# Patient Record
Sex: FEMALE | Race: WHITE | Employment: OTHER
[De-identification: names, ages, dates, MRNs, and addresses within clinical notes are randomized per-mention and may not be internally consistent; named-entity substitution may affect disease eponyms.]

---

## 2017-01-01 ENCOUNTER — HOME CARE VISIT (OUTPATIENT)
Dept: SCHEDULING | Facility: HOME HEALTH | Age: 82
End: 2017-01-01
Payer: MEDICARE

## 2017-01-01 ENCOUNTER — HOME CARE VISIT (OUTPATIENT)
Dept: HOSPICE | Facility: HOSPICE | Age: 82
End: 2017-01-01
Payer: MEDICARE

## 2017-01-01 ENCOUNTER — HOSPITAL ENCOUNTER (INPATIENT)
Age: 82
LOS: 5 days | Discharge: HOSPICE | DRG: 871 | End: 2017-01-25
Attending: EMERGENCY MEDICINE | Admitting: INTERNAL MEDICINE
Payer: MEDICARE

## 2017-01-01 ENCOUNTER — HOSPICE ADMISSION (OUTPATIENT)
Dept: HOSPICE | Facility: HOSPICE | Age: 82
End: 2017-01-01
Payer: MEDICARE

## 2017-01-01 VITALS — RESPIRATION RATE: 16 BRPM | HEART RATE: 92 BPM | TEMPERATURE: 97.3 F

## 2017-01-01 VITALS
TEMPERATURE: 97.2 F | TEMPERATURE: 97.2 F | RESPIRATION RATE: 24 BRPM | HEART RATE: 100 BPM | HEART RATE: 92 BPM | DIASTOLIC BLOOD PRESSURE: 46 MMHG | RESPIRATION RATE: 24 BRPM | DIASTOLIC BLOOD PRESSURE: 48 MMHG | SYSTOLIC BLOOD PRESSURE: 112 MMHG | SYSTOLIC BLOOD PRESSURE: 116 MMHG

## 2017-01-01 VITALS
DIASTOLIC BLOOD PRESSURE: 50 MMHG | OXYGEN SATURATION: 95 % | HEART RATE: 77 BPM | SYSTOLIC BLOOD PRESSURE: 103 MMHG | TEMPERATURE: 96.9 F | WEIGHT: 140 LBS | HEIGHT: 65 IN | RESPIRATION RATE: 16 BRPM | BODY MASS INDEX: 23.32 KG/M2

## 2017-01-01 VITALS — HEART RATE: 92 BPM | SYSTOLIC BLOOD PRESSURE: 108 MMHG | DIASTOLIC BLOOD PRESSURE: 70 MMHG | RESPIRATION RATE: 24 BRPM

## 2017-01-01 VITALS
DIASTOLIC BLOOD PRESSURE: 42 MMHG | HEART RATE: 104 BPM | TEMPERATURE: 97.2 F | RESPIRATION RATE: 32 BRPM | SYSTOLIC BLOOD PRESSURE: 90 MMHG

## 2017-01-01 VITALS
RESPIRATION RATE: 20 BRPM | SYSTOLIC BLOOD PRESSURE: 130 MMHG | TEMPERATURE: 96.1 F | DIASTOLIC BLOOD PRESSURE: 70 MMHG | HEART RATE: 80 BPM

## 2017-01-01 DIAGNOSIS — N39.0 URINARY TRACT INFECTION WITHOUT HEMATURIA, SITE UNSPECIFIED: ICD-10-CM

## 2017-01-01 DIAGNOSIS — L89.90 DECUBITUS ULCER, UNSPECIFIED PRESSURE ULCER STAGE: ICD-10-CM

## 2017-01-01 DIAGNOSIS — A41.9 SEPSIS, DUE TO UNSPECIFIED ORGANISM: Primary | ICD-10-CM

## 2017-01-01 LAB
ALBUMIN SERPL BCP-MCNC: 1.7 G/DL (ref 3.2–4.6)
ALBUMIN/GLOB SERPL: 0.5 {RATIO} (ref 1.2–3.5)
ALP SERPL-CCNC: 239 U/L (ref 50–136)
ALT SERPL-CCNC: 16 U/L (ref 12–65)
ANION GAP BLD CALC-SCNC: 11 MMOL/L (ref 7–16)
ANION GAP BLD CALC-SCNC: 6 MMOL/L (ref 7–16)
ANION GAP BLD CALC-SCNC: 6 MMOL/L (ref 7–16)
ANION GAP BLD CALC-SCNC: 7 MMOL/L (ref 7–16)
ANION GAP BLD CALC-SCNC: 8 MMOL/L (ref 7–16)
ANION GAP BLD CALC-SCNC: 9 MMOL/L (ref 7–16)
AST SERPL W P-5'-P-CCNC: 24 U/L (ref 15–37)
BACTERIA SPEC CULT: NORMAL
BACTERIA URNS QL MICRO: ABNORMAL /HPF
BASOPHILS # BLD AUTO: 0 K/UL (ref 0–0.2)
BASOPHILS # BLD: 0 % (ref 0–2)
BILIRUB SERPL-MCNC: 0.8 MG/DL (ref 0.2–1.1)
BUN SERPL-MCNC: 37 MG/DL (ref 8–23)
BUN SERPL-MCNC: 48 MG/DL (ref 8–23)
BUN SERPL-MCNC: 54 MG/DL (ref 8–23)
BUN SERPL-MCNC: 57 MG/DL (ref 8–23)
BUN SERPL-MCNC: 58 MG/DL (ref 8–23)
BUN SERPL-MCNC: 64 MG/DL (ref 8–23)
CALCIUM SERPL-MCNC: 7.5 MG/DL (ref 8.3–10.4)
CALCIUM SERPL-MCNC: 7.5 MG/DL (ref 8.3–10.4)
CALCIUM SERPL-MCNC: 7.7 MG/DL (ref 8.3–10.4)
CALCIUM SERPL-MCNC: 7.8 MG/DL (ref 8.3–10.4)
CALCIUM SERPL-MCNC: 7.8 MG/DL (ref 8.3–10.4)
CALCIUM SERPL-MCNC: 7.9 MG/DL (ref 8.3–10.4)
CASTS URNS QL MICRO: ABNORMAL /LPF
CHLORIDE SERPL-SCNC: 103 MMOL/L (ref 98–107)
CHLORIDE SERPL-SCNC: 107 MMOL/L (ref 98–107)
CHLORIDE SERPL-SCNC: 108 MMOL/L (ref 98–107)
CHLORIDE SERPL-SCNC: 109 MMOL/L (ref 98–107)
CHLORIDE SERPL-SCNC: 111 MMOL/L (ref 98–107)
CHLORIDE SERPL-SCNC: 112 MMOL/L (ref 98–107)
CO2 SERPL-SCNC: 22 MMOL/L (ref 21–32)
CO2 SERPL-SCNC: 23 MMOL/L (ref 21–32)
CO2 SERPL-SCNC: 24 MMOL/L (ref 21–32)
CO2 SERPL-SCNC: 24 MMOL/L (ref 21–32)
CO2 SERPL-SCNC: 25 MMOL/L (ref 21–32)
CO2 SERPL-SCNC: 25 MMOL/L (ref 21–32)
CREAT SERPL-MCNC: 0.84 MG/DL (ref 0.6–1)
CREAT SERPL-MCNC: 1.06 MG/DL (ref 0.6–1)
CREAT SERPL-MCNC: 1.12 MG/DL (ref 0.6–1)
CREAT SERPL-MCNC: 1.16 MG/DL (ref 0.6–1)
CREAT SERPL-MCNC: 1.27 MG/DL (ref 0.6–1)
CREAT SERPL-MCNC: 1.4 MG/DL (ref 0.6–1)
DIFFERENTIAL METHOD BLD: ABNORMAL
EOSINOPHIL # BLD: 0 K/UL (ref 0–0.8)
EOSINOPHIL # BLD: 0.1 K/UL (ref 0–0.8)
EOSINOPHIL # BLD: 0.2 K/UL (ref 0–0.8)
EOSINOPHIL NFR BLD: 0 % (ref 0.5–7.8)
EOSINOPHIL NFR BLD: 1 % (ref 0.5–7.8)
EPI CELLS #/AREA URNS HPF: ABNORMAL /HPF
ERYTHROCYTE [DISTWIDTH] IN BLOOD BY AUTOMATED COUNT: 14.9 % (ref 11.9–14.6)
ERYTHROCYTE [DISTWIDTH] IN BLOOD BY AUTOMATED COUNT: 14.9 % (ref 11.9–14.6)
ERYTHROCYTE [DISTWIDTH] IN BLOOD BY AUTOMATED COUNT: 15.1 % (ref 11.9–14.6)
ERYTHROCYTE [DISTWIDTH] IN BLOOD BY AUTOMATED COUNT: 15.2 % (ref 11.9–14.6)
ERYTHROCYTE [DISTWIDTH] IN BLOOD BY AUTOMATED COUNT: 15.3 % (ref 11.9–14.6)
ERYTHROCYTE [DISTWIDTH] IN BLOOD BY AUTOMATED COUNT: 15.3 % (ref 11.9–14.6)
GLOBULIN SER CALC-MCNC: 3.6 G/DL (ref 2.3–3.5)
GLUCOSE SERPL-MCNC: 106 MG/DL (ref 65–100)
GLUCOSE SERPL-MCNC: 108 MG/DL (ref 65–100)
GLUCOSE SERPL-MCNC: 127 MG/DL (ref 65–100)
GLUCOSE SERPL-MCNC: 129 MG/DL (ref 65–100)
GLUCOSE SERPL-MCNC: 72 MG/DL (ref 65–100)
GLUCOSE SERPL-MCNC: 86 MG/DL (ref 65–100)
GRAM STN SPEC: NORMAL
HCT VFR BLD AUTO: 20.4 % (ref 35.8–46.3)
HCT VFR BLD AUTO: 22.6 % (ref 35.8–46.3)
HCT VFR BLD AUTO: 25.8 % (ref 35.8–46.3)
HCT VFR BLD AUTO: 26.6 % (ref 35.8–46.3)
HCT VFR BLD AUTO: 26.6 % (ref 35.8–46.3)
HCT VFR BLD AUTO: 26.7 % (ref 35.8–46.3)
HCT VFR BLD AUTO: 26.8 % (ref 35.8–46.3)
HGB BLD-MCNC: 6.3 G/DL (ref 11.7–15.4)
HGB BLD-MCNC: 7.2 G/DL (ref 11.7–15.4)
HGB BLD-MCNC: 7.9 G/DL (ref 11.7–15.4)
HGB BLD-MCNC: 8.3 G/DL (ref 11.7–15.4)
HGB BLD-MCNC: 8.4 G/DL (ref 11.7–15.4)
HGB BLD-MCNC: 8.4 G/DL (ref 11.7–15.4)
HGB BLD-MCNC: 8.5 G/DL (ref 11.7–15.4)
IMM GRANULOCYTES # BLD: 0.1 K/UL (ref 0–0.5)
IMM GRANULOCYTES NFR BLD AUTO: 0.3 % (ref 0–5)
IMM GRANULOCYTES NFR BLD AUTO: 0.4 % (ref 0–5)
IMM GRANULOCYTES NFR BLD AUTO: 0.4 % (ref 0–5)
IMM GRANULOCYTES NFR BLD AUTO: 0.5 % (ref 0–5)
LACTATE BLD-SCNC: 1.3 MMOL/L (ref 0.5–1.9)
LYMPHOCYTES # BLD AUTO: 10 % (ref 13–44)
LYMPHOCYTES # BLD AUTO: 7 % (ref 13–44)
LYMPHOCYTES # BLD AUTO: 8 % (ref 13–44)
LYMPHOCYTES # BLD AUTO: 8 % (ref 13–44)
LYMPHOCYTES # BLD AUTO: 9 % (ref 13–44)
LYMPHOCYTES # BLD: 1 K/UL (ref 0.5–4.6)
LYMPHOCYTES # BLD: 1.2 K/UL (ref 0.5–4.6)
LYMPHOCYTES # BLD: 1.2 K/UL (ref 0.5–4.6)
LYMPHOCYTES # BLD: 1.3 K/UL (ref 0.5–4.6)
LYMPHOCYTES # BLD: 1.7 K/UL (ref 0.5–4.6)
LYMPHOCYTES # BLD: 2 K/UL (ref 0.5–4.6)
LYMPHOCYTES NFR BLD MANUAL: 7 % (ref 16–44)
MCH RBC QN AUTO: 27.7 PG (ref 26.1–32.9)
MCH RBC QN AUTO: 28.2 PG (ref 26.1–32.9)
MCH RBC QN AUTO: 28.2 PG (ref 26.1–32.9)
MCH RBC QN AUTO: 28.4 PG (ref 26.1–32.9)
MCH RBC QN AUTO: 28.6 PG (ref 26.1–32.9)
MCH RBC QN AUTO: 28.7 PG (ref 26.1–32.9)
MCHC RBC AUTO-ENTMCNC: 30.6 G/DL (ref 31.4–35)
MCHC RBC AUTO-ENTMCNC: 31.2 G/DL (ref 31.4–35)
MCHC RBC AUTO-ENTMCNC: 31.5 G/DL (ref 31.4–35)
MCHC RBC AUTO-ENTMCNC: 31.6 G/DL (ref 31.4–35)
MCHC RBC AUTO-ENTMCNC: 31.7 G/DL (ref 31.4–35)
MCHC RBC AUTO-ENTMCNC: 31.9 G/DL (ref 31.4–35)
MCV RBC AUTO: 88.6 FL (ref 79.6–97.8)
MCV RBC AUTO: 89.6 FL (ref 79.6–97.8)
MCV RBC AUTO: 90.5 FL (ref 79.6–97.8)
MCV RBC AUTO: 91.1 FL (ref 79.6–97.8)
MM INDURATION POC: NORMAL MM (ref 0–5)
MONOCYTES # BLD: 0.4 K/UL (ref 0.1–1.3)
MONOCYTES # BLD: 0.4 K/UL (ref 0.1–1.3)
MONOCYTES # BLD: 0.5 K/UL (ref 0.1–1.3)
MONOCYTES # BLD: 0.6 K/UL (ref 0.1–1.3)
MONOCYTES # BLD: 0.7 K/UL (ref 0.1–1.3)
MONOCYTES NFR BLD AUTO: 3 % (ref 4–12)
MONOCYTES NFR BLD AUTO: 4 % (ref 4–12)
NEUTS BAND NFR BLD MANUAL: 4 % (ref 0–6)
NEUTS SEG # BLD: 11.2 K/UL (ref 1.7–8.2)
NEUTS SEG # BLD: 11.2 K/UL (ref 1.7–8.2)
NEUTS SEG # BLD: 14.4 K/UL (ref 1.7–8.2)
NEUTS SEG # BLD: 16.9 K/UL (ref 1.7–8.2)
NEUTS SEG # BLD: 17.4 K/UL (ref 1.7–8.2)
NEUTS SEG # BLD: 22.1 K/UL (ref 1.7–8.2)
NEUTS SEG NFR BLD AUTO: 86 % (ref 43–78)
NEUTS SEG NFR BLD AUTO: 88 % (ref 43–78)
NEUTS SEG NFR BLD AUTO: 88 % (ref 43–78)
NEUTS SEG NFR BLD AUTO: 89 % (ref 43–78)
NEUTS SEG NFR BLD AUTO: 90 % (ref 43–78)
NEUTS SEG NFR BLD MANUAL: 89 % (ref 47–75)
PLATELET # BLD AUTO: 178 K/UL (ref 150–450)
PLATELET # BLD AUTO: 187 K/UL (ref 150–450)
PLATELET # BLD AUTO: 237 K/UL (ref 150–450)
PLATELET # BLD AUTO: 238 K/UL (ref 150–450)
PLATELET # BLD AUTO: 256 K/UL (ref 150–450)
PLATELET # BLD AUTO: 256 K/UL (ref 150–450)
PLATELET COMMENTS,PCOM: ADEQUATE
PLATELET COMMENTS,PCOM: ADEQUATE
PMV BLD AUTO: 11.9 FL (ref 10.8–14.1)
PMV BLD AUTO: 11.9 FL (ref 10.8–14.1)
PMV BLD AUTO: 12 FL (ref 10.8–14.1)
PMV BLD AUTO: 12 FL (ref 10.8–14.1)
PMV BLD AUTO: 12.1 FL (ref 10.8–14.1)
PMV BLD AUTO: 12.2 FL (ref 10.8–14.1)
POTASSIUM SERPL-SCNC: 3.6 MMOL/L (ref 3.5–5.1)
POTASSIUM SERPL-SCNC: 3.9 MMOL/L (ref 3.5–5.1)
POTASSIUM SERPL-SCNC: 4.2 MMOL/L (ref 3.5–5.1)
POTASSIUM SERPL-SCNC: 4.3 MMOL/L (ref 3.5–5.1)
POTASSIUM SERPL-SCNC: 5 MMOL/L (ref 3.5–5.1)
POTASSIUM SERPL-SCNC: 5.4 MMOL/L (ref 3.5–5.1)
PPD POC: NORMAL NEGATIVE
PROT SERPL-MCNC: 5.3 G/DL (ref 6.3–8.2)
RBC # BLD AUTO: 2.55 M/UL (ref 4.05–5.25)
RBC # BLD AUTO: 2.85 M/UL (ref 4.05–5.25)
RBC # BLD AUTO: 2.92 M/UL (ref 4.05–5.25)
RBC # BLD AUTO: 2.94 M/UL (ref 4.05–5.25)
RBC # BLD AUTO: 2.96 M/UL (ref 4.05–5.25)
RBC # BLD AUTO: 2.98 M/UL (ref 4.05–5.25)
RBC #/AREA URNS HPF: ABNORMAL /HPF
RBC MORPH BLD: ABNORMAL
RBC MORPH BLD: ABNORMAL
SERVICE CMNT-IMP: NORMAL
SODIUM SERPL-SCNC: 136 MMOL/L (ref 136–145)
SODIUM SERPL-SCNC: 139 MMOL/L (ref 136–145)
SODIUM SERPL-SCNC: 140 MMOL/L (ref 136–145)
SODIUM SERPL-SCNC: 141 MMOL/L (ref 136–145)
SODIUM SERPL-SCNC: 142 MMOL/L (ref 136–145)
SODIUM SERPL-SCNC: 142 MMOL/L (ref 136–145)
TROPONIN I BLD-MCNC: 0 NG/ML (ref 0–0.08)
TROPONIN I BLD-MCNC: 0.07 NG/ML (ref 0–0.08)
VANCOMYCIN TROUGH SERPL-MCNC: 15.6 UG/ML (ref 5–20)
WBC # BLD AUTO: 12.8 K/UL (ref 4.3–11.1)
WBC # BLD AUTO: 12.9 K/UL (ref 4.3–11.1)
WBC # BLD AUTO: 16.1 K/UL (ref 4.3–11.1)
WBC # BLD AUTO: 19.5 K/UL (ref 4.3–11.1)
WBC # BLD AUTO: 19.7 K/UL (ref 4.3–11.1)
WBC # BLD AUTO: 23.8 K/UL (ref 4.3–11.1)
WBC MORPH BLD: ABNORMAL
WBC URNS QL MICRO: >100 /HPF

## 2017-01-01 PROCEDURE — 74011250636 HC RX REV CODE- 250/636: Performed by: INTERNAL MEDICINE

## 2017-01-01 PROCEDURE — 65270000029 HC RM PRIVATE

## 2017-01-01 PROCEDURE — G0299 HHS/HOSPICE OF RN EA 15 MIN: HCPCS

## 2017-01-01 PROCEDURE — 36415 COLL VENOUS BLD VENIPUNCTURE: CPT | Performed by: INTERNAL MEDICINE

## 2017-01-01 PROCEDURE — 0651 HSPC ROUTINE HOME CARE

## 2017-01-01 PROCEDURE — HOSPICE MEDICATION HC HH HOSPICE MEDICATION

## 2017-01-01 PROCEDURE — G0156 HHCP-SVS OF AIDE,EA 15 MIN: HCPCS

## 2017-01-01 PROCEDURE — 97161 PT EVAL LOW COMPLEX 20 MIN: CPT

## 2017-01-01 PROCEDURE — A4927 NON-STERILE GLOVES: HCPCS

## 2017-01-01 PROCEDURE — 83605 ASSAY OF LACTIC ACID: CPT

## 2017-01-01 PROCEDURE — 80048 BASIC METABOLIC PNL TOTAL CA: CPT | Performed by: INTERNAL MEDICINE

## 2017-01-01 PROCEDURE — 99343 PR HOME VST NEW PATIENT MOD-HI SEVERITY 45 MINUTES: CPT | Performed by: INTERNAL MEDICINE

## 2017-01-01 PROCEDURE — G0155 HHCP-SVS OF CSW,EA 15 MIN: HCPCS

## 2017-01-01 PROCEDURE — 85025 COMPLETE CBC W/AUTO DIFF WBC: CPT | Performed by: EMERGENCY MEDICINE

## 2017-01-01 PROCEDURE — 87077 CULTURE AEROBIC IDENTIFY: CPT | Performed by: EMERGENCY MEDICINE

## 2017-01-01 PROCEDURE — 0T9B70Z DRAINAGE OF BLADDER WITH DRAINAGE DEVICE, VIA NATURAL OR ARTIFICIAL OPENING: ICD-10-PCS | Performed by: INTERNAL MEDICINE

## 2017-01-01 PROCEDURE — A6250 SKIN SEAL PROTECT MOISTURIZR: HCPCS

## 2017-01-01 PROCEDURE — 84484 ASSAY OF TROPONIN QUANT: CPT

## 2017-01-01 PROCEDURE — 80202 ASSAY OF VANCOMYCIN: CPT | Performed by: INTERNAL MEDICINE

## 2017-01-01 PROCEDURE — 3336500001 HSPC ELECTION

## 2017-01-01 PROCEDURE — 86580 TB INTRADERMAL TEST: CPT | Performed by: INTERNAL MEDICINE

## 2017-01-01 PROCEDURE — 74011250637 HC RX REV CODE- 250/637: Performed by: INTERNAL MEDICINE

## 2017-01-01 PROCEDURE — 94760 N-INVAS EAR/PLS OXIMETRY 1: CPT

## 2017-01-01 PROCEDURE — 36416 COLLJ CAPILLARY BLOOD SPEC: CPT | Performed by: INTERNAL MEDICINE

## 2017-01-01 PROCEDURE — 87088 URINE BACTERIA CULTURE: CPT | Performed by: EMERGENCY MEDICINE

## 2017-01-01 PROCEDURE — 74011000250 HC RX REV CODE- 250: Performed by: INTERNAL MEDICINE

## 2017-01-01 PROCEDURE — 74011000258 HC RX REV CODE- 258: Performed by: INTERNAL MEDICINE

## 2017-01-01 PROCEDURE — A6216 NON-STERILE GAUZE<=16 SQ IN: HCPCS

## 2017-01-01 PROCEDURE — 77030019605

## 2017-01-01 PROCEDURE — T4522 ADULT SIZE BRIEF/DIAPER MED: HCPCS

## 2017-01-01 PROCEDURE — 85025 COMPLETE CBC W/AUTO DIFF WBC: CPT | Performed by: INTERNAL MEDICINE

## 2017-01-01 PROCEDURE — 77010033678 HC OXYGEN DAILY

## 2017-01-01 PROCEDURE — 80053 COMPREHEN METABOLIC PANEL: CPT | Performed by: EMERGENCY MEDICINE

## 2017-01-01 PROCEDURE — 99285 EMERGENCY DEPT VISIT HI MDM: CPT | Performed by: EMERGENCY MEDICINE

## 2017-01-01 PROCEDURE — 76450000000

## 2017-01-01 PROCEDURE — 77030018836 HC SOL IRR NACL ICUM -A

## 2017-01-01 PROCEDURE — 87040 BLOOD CULTURE FOR BACTERIA: CPT | Performed by: EMERGENCY MEDICINE

## 2017-01-01 PROCEDURE — 87040 BLOOD CULTURE FOR BACTERIA: CPT | Performed by: INTERNAL MEDICINE

## 2017-01-01 PROCEDURE — 97165 OT EVAL LOW COMPLEX 30 MIN: CPT

## 2017-01-01 PROCEDURE — 85018 HEMOGLOBIN: CPT | Performed by: INTERNAL MEDICINE

## 2017-01-01 PROCEDURE — 87186 SC STD MICRODIL/AGAR DIL: CPT | Performed by: EMERGENCY MEDICINE

## 2017-01-01 PROCEDURE — 81015 MICROSCOPIC EXAM OF URINE: CPT | Performed by: EMERGENCY MEDICINE

## 2017-01-01 PROCEDURE — 74011000302 HC RX REV CODE- 302: Performed by: INTERNAL MEDICINE

## 2017-01-01 PROCEDURE — 87205 SMEAR GRAM STAIN: CPT | Performed by: EMERGENCY MEDICINE

## 2017-01-01 PROCEDURE — 87086 URINE CULTURE/COLONY COUNT: CPT | Performed by: EMERGENCY MEDICINE

## 2017-01-01 PROCEDURE — A6213 FOAM DRG >16<=48 SQ IN W/BDR: HCPCS

## 2017-01-01 PROCEDURE — 87641 MR-STAPH DNA AMP PROBE: CPT | Performed by: INTERNAL MEDICINE

## 2017-01-01 PROCEDURE — 74011250636 HC RX REV CODE- 250/636: Performed by: EMERGENCY MEDICINE

## 2017-01-01 PROCEDURE — 93306 TTE W/DOPPLER COMPLETE: CPT

## 2017-01-01 PROCEDURE — 77030034849

## 2017-01-01 PROCEDURE — T4541 LARGE DISPOSABLE UNDERPAD: HCPCS

## 2017-01-01 PROCEDURE — 77030032490 HC SLV COMPR SCD KNE COVD -B

## 2017-01-01 PROCEDURE — 87205 SMEAR GRAM STAIN: CPT | Performed by: INTERNAL MEDICINE

## 2017-01-01 PROCEDURE — 87641 MR-STAPH DNA AMP PROBE: CPT | Performed by: EMERGENCY MEDICINE

## 2017-01-01 RX ORDER — HALOPERIDOL 5 MG/ML
2 INJECTION INTRAMUSCULAR
Status: DISCONTINUED | OUTPATIENT
Start: 2017-01-01 | End: 2017-01-01 | Stop reason: HOSPADM

## 2017-01-01 RX ORDER — SODIUM CHLORIDE 9 MG/ML
100 INJECTION, SOLUTION INTRAVENOUS CONTINUOUS
Status: DISCONTINUED | OUTPATIENT
Start: 2017-01-01 | End: 2017-01-01

## 2017-01-01 RX ORDER — SODIUM CHLORIDE 0.9 % (FLUSH) 0.9 %
5-10 SYRINGE (ML) INJECTION AS NEEDED
Status: DISCONTINUED | OUTPATIENT
Start: 2017-01-01 | End: 2017-01-01 | Stop reason: HOSPADM

## 2017-01-01 RX ORDER — HEPARIN SODIUM 5000 [USP'U]/ML
5000 INJECTION, SOLUTION INTRAVENOUS; SUBCUTANEOUS EVERY 8 HOURS
Status: DISCONTINUED | OUTPATIENT
Start: 2017-01-01 | End: 2017-01-01

## 2017-01-01 RX ORDER — FUROSEMIDE 10 MG/ML
10 INJECTION INTRAMUSCULAR; INTRAVENOUS ONCE
Status: COMPLETED | OUTPATIENT
Start: 2017-01-01 | End: 2017-01-01

## 2017-01-01 RX ORDER — ACETAMINOPHEN 325 MG/1
650 TABLET ORAL
Status: DISCONTINUED | OUTPATIENT
Start: 2017-01-01 | End: 2017-01-01 | Stop reason: HOSPADM

## 2017-01-01 RX ORDER — SODIUM CHLORIDE 0.9 % (FLUSH) 0.9 %
5 SYRINGE (ML) INJECTION EVERY 8 HOURS
Status: DISCONTINUED | OUTPATIENT
Start: 2017-01-01 | End: 2017-01-01 | Stop reason: HOSPADM

## 2017-01-01 RX ORDER — HYDROCODONE BITARTRATE AND ACETAMINOPHEN 5; 325 MG/1; MG/1
1 TABLET ORAL
Status: DISCONTINUED | OUTPATIENT
Start: 2017-01-01 | End: 2017-01-01 | Stop reason: HOSPADM

## 2017-01-01 RX ORDER — HYDROCODONE BITARTRATE AND ACETAMINOPHEN 10; 325 MG/1; MG/1
1 TABLET ORAL
Status: ON HOLD | COMMUNITY
End: 2017-01-01

## 2017-01-01 RX ORDER — LEVOFLOXACIN 5 MG/ML
750 INJECTION, SOLUTION INTRAVENOUS
Status: DISCONTINUED | OUTPATIENT
Start: 2017-01-01 | End: 2017-01-01

## 2017-01-01 RX ORDER — HYDROCODONE BITARTRATE AND ACETAMINOPHEN 10; 325 MG/1; MG/1
1 TABLET ORAL
Qty: 10 TAB | Refills: 0 | Status: SHIPPED | OUTPATIENT
Start: 2017-01-01 | End: 2017-01-01

## 2017-01-01 RX ORDER — ONDANSETRON 2 MG/ML
4 INJECTION INTRAMUSCULAR; INTRAVENOUS
Status: DISCONTINUED | OUTPATIENT
Start: 2017-01-01 | End: 2017-01-01 | Stop reason: HOSPADM

## 2017-01-01 RX ORDER — SODIUM CHLORIDE 9 MG/ML
1000 INJECTION, SOLUTION INTRAVENOUS ONCE
Status: COMPLETED | OUTPATIENT
Start: 2017-01-01 | End: 2017-01-01

## 2017-01-01 RX ORDER — SODIUM CHLORIDE 9 MG/ML
75 INJECTION, SOLUTION INTRAVENOUS CONTINUOUS
Status: DISCONTINUED | OUTPATIENT
Start: 2017-01-01 | End: 2017-01-01

## 2017-01-01 RX ORDER — MORPHINE SULFATE 10 MG/1
10 CAPSULE, EXTENDED RELEASE ORAL DAILY
Status: ON HOLD | COMMUNITY
End: 2017-01-01

## 2017-01-01 RX ORDER — HYDROCODONE BITARTRATE AND ACETAMINOPHEN 10; 325 MG/1; MG/1
1 TABLET ORAL
Status: DISCONTINUED | OUTPATIENT
Start: 2017-01-01 | End: 2017-01-01 | Stop reason: HOSPADM

## 2017-01-01 RX ORDER — AMOXICILLIN 250 MG
2 CAPSULE ORAL
Status: DISCONTINUED | OUTPATIENT
Start: 2017-01-01 | End: 2017-01-01 | Stop reason: HOSPADM

## 2017-01-01 RX ORDER — MORPHINE SULFATE 10 MG/1
10 CAPSULE, EXTENDED RELEASE ORAL DAILY
Qty: 10 CAP | Refills: 0 | Status: SHIPPED | OUTPATIENT
Start: 2017-01-01 | End: 2017-01-01

## 2017-01-01 RX ORDER — MUPIROCIN 20 MG/G
OINTMENT TOPICAL 2 TIMES DAILY
Status: DISCONTINUED | OUTPATIENT
Start: 2017-01-01 | End: 2017-01-01 | Stop reason: HOSPADM

## 2017-01-01 RX ORDER — BISACODYL 5 MG
5 TABLET, DELAYED RELEASE (ENTERIC COATED) ORAL DAILY PRN
Status: DISCONTINUED | OUTPATIENT
Start: 2017-01-01 | End: 2017-01-01 | Stop reason: HOSPADM

## 2017-01-01 RX ADMIN — VANCOMYCIN HYDROCHLORIDE 1000 MG: 1 INJECTION, POWDER, LYOPHILIZED, FOR SOLUTION INTRAVENOUS at 10:34

## 2017-01-01 RX ADMIN — SODIUM CHLORIDE 1000 ML: 900 INJECTION, SOLUTION INTRAVENOUS at 23:22

## 2017-01-01 RX ADMIN — HEPARIN SODIUM 5000 UNITS: 5000 INJECTION, SOLUTION INTRAVENOUS; SUBCUTANEOUS at 23:10

## 2017-01-01 RX ADMIN — SODIUM POLYSTYRENE SULFONATE 15 G: 15 SUSPENSION ORAL; RECTAL at 09:43

## 2017-01-01 RX ADMIN — SODIUM CHLORIDE 75 ML/HR: 900 INJECTION, SOLUTION INTRAVENOUS at 22:53

## 2017-01-01 RX ADMIN — AZTREONAM 1 G: 1 INJECTION, POWDER, LYOPHILIZED, FOR SOLUTION INTRAMUSCULAR; INTRAVENOUS at 00:36

## 2017-01-01 RX ADMIN — SODIUM CHLORIDE 75 ML/HR: 900 INJECTION, SOLUTION INTRAVENOUS at 04:10

## 2017-01-01 RX ADMIN — HYDROCODONE BITARTRATE AND ACETAMINOPHEN 1 TABLET: 5; 325 TABLET ORAL at 12:32

## 2017-01-01 RX ADMIN — Medication 5 ML: at 06:05

## 2017-01-01 RX ADMIN — HYDROCODONE BITARTRATE AND ACETAMINOPHEN 1 TABLET: 10; 325 TABLET ORAL at 16:46

## 2017-01-01 RX ADMIN — VANCOMYCIN HYDROCHLORIDE 1000 MG: 1 INJECTION, POWDER, LYOPHILIZED, FOR SOLUTION INTRAVENOUS at 09:43

## 2017-01-01 RX ADMIN — Medication 5 ML: at 22:52

## 2017-01-01 RX ADMIN — Medication 5 ML: at 05:47

## 2017-01-01 RX ADMIN — HEPARIN SODIUM 5000 UNITS: 5000 INJECTION, SOLUTION INTRAVENOUS; SUBCUTANEOUS at 22:51

## 2017-01-01 RX ADMIN — VANCOMYCIN HYDROCHLORIDE 1000 MG: 1 INJECTION, POWDER, LYOPHILIZED, FOR SOLUTION INTRAVENOUS at 21:34

## 2017-01-01 RX ADMIN — SODIUM CHLORIDE 150 ML/HR: 900 INJECTION, SOLUTION INTRAVENOUS at 00:16

## 2017-01-01 RX ADMIN — VANCOMYCIN HYDROCHLORIDE 1000 MG: 1 INJECTION, POWDER, LYOPHILIZED, FOR SOLUTION INTRAVENOUS at 09:48

## 2017-01-01 RX ADMIN — MUPIROCIN: 20 OINTMENT TOPICAL at 17:26

## 2017-01-01 RX ADMIN — HYDROCODONE BITARTRATE AND ACETAMINOPHEN 1 TABLET: 10; 325 TABLET ORAL at 10:34

## 2017-01-01 RX ADMIN — MUPIROCIN: 20 OINTMENT TOPICAL at 07:57

## 2017-01-01 RX ADMIN — Medication 5 ML: at 16:00

## 2017-01-01 RX ADMIN — Medication 5 ML: at 00:16

## 2017-01-01 RX ADMIN — HYDROCODONE BITARTRATE AND ACETAMINOPHEN 1 TABLET: 10; 325 TABLET ORAL at 15:58

## 2017-01-01 RX ADMIN — SODIUM CHLORIDE 1000 ML: 900 INJECTION, SOLUTION INTRAVENOUS at 21:34

## 2017-01-01 RX ADMIN — Medication 5 ML: at 05:10

## 2017-01-01 RX ADMIN — HYDROCODONE BITARTRATE AND ACETAMINOPHEN 1 TABLET: 5; 325 TABLET ORAL at 04:35

## 2017-01-01 RX ADMIN — SODIUM CHLORIDE 125 ML/HR: 900 INJECTION, SOLUTION INTRAVENOUS at 08:58

## 2017-01-01 RX ADMIN — MUPIROCIN: 20 OINTMENT TOPICAL at 09:00

## 2017-01-01 RX ADMIN — HEPARIN SODIUM 5000 UNITS: 5000 INJECTION, SOLUTION INTRAVENOUS; SUBCUTANEOUS at 06:05

## 2017-01-01 RX ADMIN — SODIUM CHLORIDE 150 ML/HR: 900 INJECTION, SOLUTION INTRAVENOUS at 08:11

## 2017-01-01 RX ADMIN — Medication 5 ML: at 15:48

## 2017-01-01 RX ADMIN — Medication 5 ML: at 21:46

## 2017-01-01 RX ADMIN — HYDROCODONE BITARTRATE AND ACETAMINOPHEN 1 TABLET: 10; 325 TABLET ORAL at 16:55

## 2017-01-01 RX ADMIN — LEVOFLOXACIN 750 MG: 5 INJECTION, SOLUTION INTRAVENOUS at 11:32

## 2017-01-01 RX ADMIN — VANCOMYCIN HYDROCHLORIDE 1000 MG: 1 INJECTION, POWDER, LYOPHILIZED, FOR SOLUTION INTRAVENOUS at 08:58

## 2017-01-01 RX ADMIN — MUPIROCIN: 20 OINTMENT TOPICAL at 10:34

## 2017-01-01 RX ADMIN — HEPARIN SODIUM 5000 UNITS: 5000 INJECTION, SOLUTION INTRAVENOUS; SUBCUTANEOUS at 15:58

## 2017-01-01 RX ADMIN — Medication 5 ML: at 06:32

## 2017-01-01 RX ADMIN — VANCOMYCIN HYDROCHLORIDE 1000 MG: 1 INJECTION, POWDER, LYOPHILIZED, FOR SOLUTION INTRAVENOUS at 02:46

## 2017-01-01 RX ADMIN — MUPIROCIN: 20 OINTMENT TOPICAL at 15:59

## 2017-01-01 RX ADMIN — MUPIROCIN: 20 OINTMENT TOPICAL at 19:24

## 2017-01-01 RX ADMIN — HEPARIN SODIUM 5000 UNITS: 5000 INJECTION, SOLUTION INTRAVENOUS; SUBCUTANEOUS at 00:16

## 2017-01-01 RX ADMIN — FUROSEMIDE 10 MG: 10 INJECTION, SOLUTION INTRAMUSCULAR; INTRAVENOUS at 04:49

## 2017-01-01 RX ADMIN — SODIUM CHLORIDE 75 ML/HR: 900 INJECTION, SOLUTION INTRAVENOUS at 15:46

## 2017-01-01 RX ADMIN — Medication 5 ML: at 06:09

## 2017-01-01 RX ADMIN — Medication 5 ML: at 23:10

## 2017-01-01 RX ADMIN — HYDROCODONE BITARTRATE AND ACETAMINOPHEN 1 TABLET: 5; 325 TABLET ORAL at 01:44

## 2017-01-01 RX ADMIN — HEPARIN SODIUM 5000 UNITS: 5000 INJECTION, SOLUTION INTRAVENOUS; SUBCUTANEOUS at 15:47

## 2017-01-01 RX ADMIN — HEPARIN SODIUM 5000 UNITS: 5000 INJECTION, SOLUTION INTRAVENOUS; SUBCUTANEOUS at 16:10

## 2017-01-01 RX ADMIN — SODIUM CHLORIDE 100 ML/HR: 900 INJECTION, SOLUTION INTRAVENOUS at 19:24

## 2017-01-01 RX ADMIN — HEPARIN SODIUM 5000 UNITS: 5000 INJECTION, SOLUTION INTRAVENOUS; SUBCUTANEOUS at 07:56

## 2017-01-01 RX ADMIN — MUPIROCIN: 20 OINTMENT TOPICAL at 16:02

## 2017-01-01 RX ADMIN — MUPIROCIN: 20 OINTMENT TOPICAL at 08:20

## 2017-01-01 RX ADMIN — LEVOFLOXACIN 750 MG: 5 INJECTION, SOLUTION INTRAVENOUS at 09:52

## 2017-01-01 RX ADMIN — MUPIROCIN: 20 OINTMENT TOPICAL at 09:49

## 2017-01-01 RX ADMIN — HEPARIN SODIUM 5000 UNITS: 5000 INJECTION, SOLUTION INTRAVENOUS; SUBCUTANEOUS at 08:20

## 2017-01-01 RX ADMIN — TUBERCULIN PURIFIED PROTEIN DERIVATIVE 5 UNITS: 5 INJECTION, SOLUTION INTRADERMAL at 11:32

## 2017-01-01 RX ADMIN — HYDROCODONE BITARTRATE AND ACETAMINOPHEN 1 TABLET: 10; 325 TABLET ORAL at 06:04

## 2017-01-20 PROBLEM — L89.94 PRESSURE ULCER, STAGE 4 (HCC): Status: ACTIVE | Noted: 2017-01-01

## 2017-01-20 PROBLEM — A41.9 SEPSIS (HCC): Status: ACTIVE | Noted: 2017-01-01

## 2017-01-20 PROBLEM — R00.0 TACHYCARDIA: Status: ACTIVE | Noted: 2017-01-01

## 2017-01-21 PROBLEM — F03.90 DEMENTIA WITHOUT BEHAVIORAL DISTURBANCE (HCC): Chronic | Status: ACTIVE | Noted: 2017-01-01

## 2017-01-21 PROBLEM — R00.0 TACHYCARDIA: Status: RESOLVED | Noted: 2017-01-01 | Resolved: 2017-01-01

## 2017-01-21 PROBLEM — R53.81 DEBILITY: Status: ACTIVE | Noted: 2017-01-01

## 2017-01-21 PROBLEM — N39.0 RECURRENT UTI: Status: ACTIVE | Noted: 2017-01-01

## 2017-01-21 PROBLEM — A41.51 SEPSIS DUE TO ESCHERICHIA COLI (HCC): Status: ACTIVE | Noted: 2017-01-01

## 2017-01-21 NOTE — PROGRESS NOTES
Pt resting quietly without complaints of pain, respirations are even no distress observed. No change in assessment no needs voiced.

## 2017-01-21 NOTE — PROGRESS NOTES
Assessment complete via flow sheet. Patient drowsy unless touch or moved then she yells out at staff. Unable to assess orientation level. HR regular. Lung sounds clear. Dual skin assessment performed with SHASHANK Ledesma. Pt has large ulcer to sacrum with small open bleeding areas noted. Non blanchable erythema noted to left hip. Allevyn dressing placed on sacrum and left hip. Small dark red areas noted over spine. Deep tissue injury noted to R heel. Heels floated. +4 pitting edema noted to BLE. Patient incontinent of dale malodorous urine. Bed low/locked with call light with in reach.

## 2017-01-21 NOTE — H&P
History and Physical    Subjective:     Atul Brizuela is a 80 y.o.  female who presents with AMS. No family at bedside. Attempts to contact family unsuccessful. Unable to obtain history. In ED found to have sepsis from UTI vs decubitus ulcer. Past Medical History   Diagnosis Date    Anemia 8/22/2012    Arthritis      RA    Cancer (HCC)      colorectal cancer    Chronic pain     Colorectal cancer ([facility] [address] 8/22/2012    Gastrointestinal disorder      GERD    GERD (gastroesophageal reflux disease) 8/22/2012    Hypertension 8/22/2012    Memory loss 8/22/2012    Other ill-defined conditions(799.89)      anemia    Urinary incontinence 8/22/2012      Past Surgical History   Procedure Laterality Date    Hx orthopaedic       macie knee sx, sx left foot    Hx cholecystectomy      Hx hysterectomy      Hx cervical fusion       Family History   Problem Relation Age of Onset    Heart Disease Father       Social History   Substance Use Topics    Smoking status: Never Smoker    Smokeless tobacco: Never Used    Alcohol use No       Prior to Admission medications    Medication Sig Start Date End Date Taking? Authorizing Provider   Morphine (RAFAELA) 10 mg SR capsule Take 10 mg by mouth daily. Yes Phys Other, MD   HYDROcodone-acetaminophen (NORCO)  mg tablet Take 1 Tab by mouth every six (6) hours as needed for Pain. Yes Phys Other, MD   brief disposable (ADULT BRIEFS - LARGE) misc by Does Not Apply route. 9/29/14   Sosa Hernandez MD     Allergies   Allergen Reactions    Pcn [Penicillins] Anaphylaxis    Codeine Nausea Only        Review of Systems:  Review of systems not obtained due to patient factors. Objective:      Intake and Output:            Physical Exam:   Visit Vitals    BP 98/45 (BP 1 Location: Right arm, BP Patient Position: At rest;Supine)    Pulse 74    Temp 95.6 °F (35.3 °C)    Resp 16    Wt 63.5 kg (140 lb)    SpO2 93%    BMI 23.3 kg/m2     General appearance: responds to pain, no distress, appears stated age  Head: Normocephalic, without obvious abnormality, atraumatic  Lungs: clear to auscultation bilaterally  Heart: S1, S2 normal  Abdomen: soft, non-tender. Bowel sounds normal. No masses,  no organomegaly  Skin: large decub on sacral area. Neurologic: unable to assess. Data Review:   Recent Results (from the past 24 hour(s))   URINE MICROSCOPIC    Collection Time: 01/20/17  8:08 PM   Result Value Ref Range    WBC >100 (H) 0 /hpf    RBC 10-20 0 /hpf    Epithelial cells 3-5 0 /hpf    Bacteria 4+ (H) 0 /hpf    Casts 5-10 0 /lpf   CBC WITH AUTOMATED DIFF    Collection Time: 01/20/17  8:30 PM   Result Value Ref Range    WBC 23.8 (H) 4.3 - 11.1 K/uL    RBC 2.96 (L) 4.05 - 5.25 M/uL    HGB 8.5 (L) 11.7 - 15.4 g/dL    HCT 26.8 (L) 35.8 - 46.3 %    MCV 90.5 79.6 - 97.8 FL    MCH 28.7 26.1 - 32.9 PG    MCHC 31.7 31.4 - 35.0 g/dL    RDW 15.1 (H) 11.9 - 14.6 %    PLATELET 065 879 - 855 K/uL    MPV 12.2 10.8 - 14.1 FL    NEUTROPHILS 89 (H) 47 - 75 %    BANDS 4 0 - 6 %    LYMPHOCYTES 7 (L) 16 - 44 %    ABS. NEUTROPHILS 22.1 (H) 1.7 - 8.2 K/UL    ABS. LYMPHOCYTES 1.7 0.5 - 4.6 K/UL    RBC COMMENTS SLIGHT  POLYCHROMASIA        PLATELET COMMENTS ADEQUATE      DF MANUAL     METABOLIC PANEL, COMPREHENSIVE    Collection Time: 01/20/17  8:30 PM   Result Value Ref Range    Sodium 136 136 - 145 mmol/L    Potassium 5.0 3.5 - 5.1 mmol/L    Chloride 103 98 - 107 mmol/L    CO2 25 21 - 32 mmol/L    Anion gap 8 7 - 16 mmol/L    Glucose 106 (H) 65 - 100 mg/dL    BUN 64 (H) 8 - 23 MG/DL    Creatinine 1.40 (H) 0.6 - 1.0 MG/DL    GFR est AA 45 (L) >60 ml/min/1.73m2    GFR est non-AA 37 (L) >60 ml/min/1.73m2    Calcium 7.9 (L) 8.3 - 10.4 MG/DL    Bilirubin, total 0.8 0.2 - 1.1 MG/DL    ALT 16 12 - 65 U/L    AST 24 15 - 37 U/L    Alk.  phosphatase 239 (H) 50 - 136 U/L    Protein, total 5.3 (L) 6.3 - 8.2 g/dL    Albumin 1.7 (L) 3.2 - 4.6 g/dL    Globulin 3.6 (H) 2.3 - 3.5 g/dL    A-G Ratio 0.5 (L) 1.2 - 3.5     POC LACTIC ACID    Collection Time: 01/20/17  8:38 PM   Result Value Ref Range    Lactic Acid (POC) 1.3 0.5 - 1.9 mmol/L   POC TROPONIN-I    Collection Time: 01/20/17  8:39 PM   Result Value Ref Range    Troponin-I (POC) 0.07 0.0 - 0.08 ng/ml   POC TROPONIN-I    Collection Time: 01/20/17  8:40 PM   Result Value Ref Range    Troponin-I (POC) 0 0.0 - 0.08 ng/ml           Assessment:     Principal Problem:    Sepsis ([facility] [address] (1/20/2017)    Active Problems:    UTI (urinary tract infection) (12/26/2016)      Tachycardia (1/20/2017)      Overview: ST      Pressure ulcer, stage 4 ([facility] [address] (1/20/2017)        Plan:     Admit to medical bed. Watch for hypotension. Was given 1 L NS bolus in ED. Lactic was normal  Vanc and aztreonam  Hold all po meds for now  Blood and urine cultures  Wound care  FULL code for now but this needs to be addressed by family. I was unable to reach them via phone. DC in 3-4 days.      Signed By: Daiana Taylor DO     January 20, 2017

## 2017-01-21 NOTE — ED TRIAGE NOTES
Thursday pt combative and yelling. Per daughter not getting and drinking since yesterday.  Has sacral wound

## 2017-01-21 NOTE — PROGRESS NOTES
TRANSFER - IN REPORT:    Verbal report received from Keven(name) on Farida Guevara  being received from ER(unit) for routine progression of care      Report consisted of patients Situation, Background, Assessment and   Recommendations(SBAR). Information from the following report(s) SBAR, Kardex, ED Summary, [facility] and Recent Results was reviewed with the receiving nurse. Opportunity for questions and clarification was provided. Assessment to be completed upon patients arrival to unit and care assumed.

## 2017-01-21 NOTE — PROGRESS NOTES
Pharmacokinetic Consult to Pharmacist    Kb Maxwell is a 80 y.o. female being treated for sepsis with vancomycin and azactam.    Ht Readings from Last 1 Encounters:   12/26/16 5' 5\" (1.651 m)      Wt Readings from Last 1 Encounters:   01/20/17 63.5 kg (140 lb)       Lab Results   Component Value Date/Time    BUN 64 01/20/2017 08:30 PM    Creatinine 1.40 01/20/2017 08:30 PM    WBC 23.8 01/20/2017 08:30 PM      Estimated Creatinine Clearance: 22.1 mL/min (based on Cr of 1.4). CULTURES:  Pending. Day 1 of vancomycin. Goal trough is 15-20. Vancomycin dose initiated at 1g q 24h. Will continue to follow patient.       Thank you,  Abilio Walker, PharmD  Clinical Pharmacist

## 2017-01-21 NOTE — PROGRESS NOTES
Hospitalist Progress Note     Admit Date:  2017  8:08 PM   Name:  Kaleigh Tavarez   Age:  80 y.o.  :  1/15/1923   MRN:  [id number]   PCP:  Adán Hernandez MD  Treatment Team: Attending Provider: Edna Greenwood DO    Subjective:   Pt admitted with sepsis from UTI, started on abx and IVF. In last few years she has had general decline in quality of life and functional status, along with issues with memory loss. Recent admission and discharge for UTI in last month also.  - pt altered so cannot obtain ROS. She does mumble in response to touch and loud voice      Objective:     Patient Vitals for the past 24 hrs:   Temp Pulse Resp BP SpO2   170 95.5 °F (35.3 °C) 89 16 111/58 98 %   17 96.4 °F (35.8 °C) - - - -   17 95.6 °F (35.3 °C) 74 16 98/45 93 %   17 - - - 103/59 94 %   17 - - - - 100 %   17 - - - - 100 %   17 98.7 °F (37.1 °C) (!) 121 18 143/67 92 %     Oxygen Therapy  O2 Sat (%): 98 % (17)  Pulse via Oximetry: 82 beats per minute (17)  O2 Device: Room air (17)    Intake/Output Summary (Last 24 hours) at 17 0841  Last data filed at 17 0400   Gross per 24 hour   Intake             2169 ml   Output                0 ml   Net             2169 ml       General:    Frail. No apparent distress. CV:   RRR. No murmur, rub, or gallop. Lungs:   Clear to auscultation bilaterally. No wheezing, rhonchi, or rales. Abdomen:   Soft, nontender, nondistended. Bowel sounds normal.   Extremities: Warm and dry. No cyanosis. BLE edema   Skin:     No rashes or jaundice.      Current Meds:  Current Facility-Administered Medications   Medication Dose Route Frequency    mupirocin (BACTROBAN) 2 % ointment   Topical BID    cefTRIAXone (ROCEPHIN) 1 g in 0.9% sodium chloride (MBP/ADV) 50 mL  1 g IntraVENous Q24H    HYDROcodone-acetaminophen (NORCO)  mg tablet 1 Tab  1 Tab Oral Q4H PRN  HYDROcodone-acetaminophen (NORCO) 5-325 mg per tablet 1 Tab  1 Tab Oral Q4H PRN    sodium chloride (NS) flush 5 mL  5 mL InterCATHeter Q8H    sodium chloride (NS) flush 5-10 mL  5-10 mL InterCATHeter PRN    0.9% sodium chloride infusion  75 mL/hr IntraVENous CONTINUOUS    heparin (porcine) injection 5,000 Units  5,000 Units SubCUTAneous Q8H       Labs and Studies:  I have reviewed all labs, meds, telemetry events, and studies from the last 24 hours. Recent Results (from the past 24 hour(s))   URINE MICROSCOPIC    Collection Time: 01/20/17  8:08 PM   Result Value Ref Range    WBC >100 (H) 0 /hpf    RBC 10-20 0 /hpf    Epithelial cells 3-5 0 /hpf    Bacteria 4+ (H) 0 /hpf    Casts 5-10 0 /lpf   CBC WITH AUTOMATED DIFF    Collection Time: 01/20/17  8:30 PM   Result Value Ref Range    WBC 23.8 (H) 4.3 - 11.1 K/uL    RBC 2.96 (L) 4.05 - 5.25 M/uL    HGB 8.5 (L) 11.7 - 15.4 g/dL    HCT 26.8 (L) 35.8 - 46.3 %    MCV 90.5 79.6 - 97.8 FL    MCH 28.7 26.1 - 32.9 PG    MCHC 31.7 31.4 - 35.0 g/dL    RDW 15.1 (H) 11.9 - 14.6 %    PLATELET 747 819 - 160 K/uL    MPV 12.2 10.8 - 14.1 FL    NEUTROPHILS 89 (H) 47 - 75 %    BANDS 4 0 - 6 %    LYMPHOCYTES 7 (L) 16 - 44 %    ABS. NEUTROPHILS 22.1 (H) 1.7 - 8.2 K/UL    ABS. LYMPHOCYTES 1.7 0.5 - 4.6 K/UL    RBC COMMENTS SLIGHT  POLYCHROMASIA        PLATELET COMMENTS ADEQUATE      DF MANUAL     METABOLIC PANEL, COMPREHENSIVE    Collection Time: 01/20/17  8:30 PM   Result Value Ref Range    Sodium 136 136 - 145 mmol/L    Potassium 5.0 3.5 - 5.1 mmol/L    Chloride 103 98 - 107 mmol/L    CO2 25 21 - 32 mmol/L    Anion gap 8 7 - 16 mmol/L    Glucose 106 (H) 65 - 100 mg/dL    BUN 64 (H) 8 - 23 MG/DL    Creatinine 1.40 (H) 0.6 - 1.0 MG/DL    GFR est AA 45 (L) >60 ml/min/1.73m2    GFR est non-AA 37 (L) >60 ml/min/1.73m2    Calcium 7.9 (L) 8.3 - 10.4 MG/DL    Bilirubin, total 0.8 0.2 - 1.1 MG/DL    ALT 16 12 - 65 U/L    AST 24 15 - 37 U/L    Alk.  phosphatase 239 (H) 50 - 136 U/L Protein, total 5.3 (L) 6.3 - 8.2 g/dL    Albumin 1.7 (L) 3.2 - 4.6 g/dL    Globulin 3.6 (H) 2.3 - 3.5 g/dL    A-G Ratio 0.5 (L) 1.2 - 3.5     CULTURE, BLOOD    Collection Time: 01/20/17  8:30 PM   Result Value Ref Range    Special Requests: LEFT ANTECUBITAL      Culture result: NO GROWTH AFTER 9 HOURS     POC LACTIC ACID    Collection Time: 01/20/17  8:38 PM   Result Value Ref Range    Lactic Acid (POC) 1.3 0.5 - 1.9 mmol/L   POC TROPONIN-I    Collection Time: 01/20/17  8:39 PM   Result Value Ref Range    Troponin-I (POC) 0.07 0.0 - 0.08 ng/ml   POC TROPONIN-I    Collection Time: 01/20/17  8:40 PM   Result Value Ref Range    Troponin-I (POC) 0 0.0 - 0.08 ng/ml   MRSA SCREEN - PCR (NASAL)    Collection Time: 01/20/17 11:25 PM   Result Value Ref Range    Special Requests: NO SPECIAL REQUESTS      Culture result:        MRSA target DNA is detected (presumptive positive for MRSA colonization). Culture result:        RESULTS VERIFIED, PHONED TO AND READ BACK BY  CRISTOBAL Eli 90 1/21/17 AT 0148. Summer Troy SP     CULTURE, BLOOD    Collection Time: 01/20/17 11:31 PM   Result Value Ref Range    Special Requests: LEFT WRIST      Culture result: NO GROWTH AFTER 7 HOURS     CBC WITH AUTOMATED DIFF    Collection Time: 01/21/17  6:30 AM   Result Value Ref Range    WBC 19.5 (H) 4.3 - 11.1 K/uL    RBC 2.92 (L) 4.05 - 5.25 M/uL    HGB 8.3 (L) 11.7 - 15.4 g/dL    HCT 26.6 (L) 35.8 - 46.3 %    MCV 91.1 79.6 - 97.8 FL    MCH 28.4 26.1 - 32.9 PG    MCHC 31.2 (L) 31.4 - 35.0 g/dL    RDW 15.2 (H) 11.9 - 14.6 %    PLATELET 324 389 - 313 K/uL    MPV 12.1 10.8 - 14.1 FL    DF AUTOMATED      NEUTROPHILS 90 (H) 43 - 78 %    LYMPHOCYTES 7 (L) 13 - 44 %    MONOCYTES 3 (L) 4.0 - 12.0 %    EOSINOPHILS 0 (L) 0.5 - 7.8 %    BASOPHILS 0 0.0 - 2.0 %    IMMATURE GRANULOCYTES 0.4 0.0 - 5.0 %    ABS. NEUTROPHILS 17.4 (H) 1.7 - 8.2 K/UL    ABS. LYMPHOCYTES 1.3 0.5 - 4.6 K/UL    ABS. MONOCYTES 0.7 0.1 - 1.3 K/UL    ABS. EOSINOPHILS 0.1 0.0 - 0.8 K/UL    ABS. BASOPHILS 0.0 0.0 - 0.2 K/UL    ABS. IMM. GRANS. 0.1 0.0 - 0.5 K/UL   METABOLIC PANEL, BASIC    Collection Time: 01/21/17  6:30 AM   Result Value Ref Range    Sodium 139 136 - 145 mmol/L    Potassium 5.4 (H) 3.5 - 5.1 mmol/L    Chloride 108 (H) 98 - 107 mmol/L    CO2 24 21 - 32 mmol/L    Anion gap 7 7 - 16 mmol/L    Glucose 86 65 - 100 mg/dL    BUN 58 (H) 8 - 23 MG/DL    Creatinine 1.16 (H) 0.6 - 1.0 MG/DL    GFR est AA 56 (L) >60 ml/min/1.73m2    GFR est non-AA 46 (L) >60 ml/min/1.73m2    Calcium 7.8 (L) 8.3 - 10.4 MG/DL        Recent Imaging:  CXR Results  (Last 48 hours)    None        CT Results  (Last 48 hours)    None          Assessment and Plan:     Hospital Problems as of 1/21/2017  Date Reviewed: 9/26/2016          Codes Class Noted - Resolved POA    Debility ICD-10-CM: R53.81  ICD-9-CM: 799.3  1/21/2017 - Present Yes        Dementia without behavioral disturbance (Chronic) ICD-10-CM: F03.90  ICD-9-CM: 294.20  1/21/2017 - Present Yes        Recurrent UTI ICD-10-CM: N39.0  ICD-9-CM: 599.0  1/21/2017 - Present Yes        * (Principal)Sepsis due to Escherichia coli [facility]) ICD-10-CM: A41.51  ICD-9-CM: 038.42, 995.91  1/20/2017 - Present Yes    Overview Signed 1/21/2017  7:58 AM by Mary Kate Brown MD     UTI             Pressure ulcer, stage 4 ([facility] [address] ICD-10-CM: L89.94  ICD-9-CM: 707.00, 707.24  1/20/2017 - Present Yes        Acute cystitis without hematuria ICD-10-CM: N30.00  ICD-9-CM: 595.0  12/26/2016 - Present Yes        RESOLVED: Tachycardia ICD-10-CM: R00.0  ICD-9-CM: 785.0  1/20/2017 - 1/21/2017 Yes    Overview Signed 1/20/2017  9:25 PM by Lysle Kussmaul, DO ST                     PLAN:    · start levaquin. No need for vanc and aztreonam based on previous susceptibilities. · Consult palliative care as on review of records pt has had ongoing issues with pain and general decline in health and quality of life over last few years.   recent hospital admission with sucessfully treated UTI with correct abx per micro results, but now she is back again with same issue  · Cont PT/OT  · Cont wound care    DC planning:  Pending. Pt came from SNF.         Signed:  Petr Bonner MD

## 2017-01-21 NOTE — PROGRESS NOTES
Problem: Self Care Deficits Care Plan (Adult)  Goal: *Acute Goals and Plan of Care (Insert Text)      OCCUPATIONAL THERAPY: Initial Assessment and Discharge 1/21/2017  INPATIENT: Hospital Day: 2  Payor: [address] [place] [address] / Plan: [address] / Product Type: Managed Care Medicare /      NAME/AGE/GENDER: Kevin Vegas is a 80 y.o. female            PRIMARY DIAGNOSIS:  Sepsis ([address] Sepsis due to Escherichia coli ([address] Sepsis due to Escherichia coli ([address]        ICD-10: Treatment Diagnosis:        · Generalized Muscle Weakness (M62.81)   Precautions/Allergies:         Pcn [penicillins] and Codeine       ASSESSMENT:      Ms. Mariya Valle is a elderly lady admitted with above diagnosis. Pt seems to have come from a SNF, no family present. Pt was seen in room with PCT present for a bath. Pt was unable to complete any bed mobility or move lower extremities. Pt did answer questions and seemed to have awareness of current situation. Pt stated she had not walked in a long time and stayed in bed. She seems to be assisted with all self care to include feeding. Pt was noted to have significant edema bilateral lower extremities, a pressure sore on her right heel, a very red and irritated left heel and a significant pressure sore on her sacrum. Based on pt's condition I don't feel she would benefit from OT at this time. Pt will require long term care or perhaps palliative care. OCCUPATIONAL PROFILE AND HISTORY:   History of Present Injury/Illness (Reason for Referral):  Sepsis   Past Medical History/Comorbidities:   Ms. Mariya Valle  has a past medical history of Anemia (8/22/2012); Arthritis; Cancer ([address] Chronic pain; Colorectal cancer ([address] (8/22/2012); Gastrointestinal disorder; GERD (gastroesophageal reflux disease) (8/22/2012); Hypertension (8/22/2012); Memory loss (8/22/2012); Other ill-defined conditions(799.89); and Urinary incontinence (8/22/2012).  She also has no past medical history of Aneurysm ([facility] [address] Arrhythmia; Asthma; Autoimmune disease ([facility] [address] CAD (coronary artery disease); Chronic kidney disease; Coagulation defects; COPD; Diabetes ([facility] [address] Endocrine disease; Heart failure ([facility] [address] Liver disease; Psychiatric disorder; PUD (peptic ulcer disease); Seizures ([facility] Stroke [facility]); Thromboembolus ([facility] or Thyroid disease. Ms. Melida Simmons  has a past surgical history that includes orthopaedic; cholecystectomy; hysterectomy; and cervical fusion. Social History/Living Environment:     pt appears to live at a SNF   Prior Level of Function/Work/Activity:  Dependent with all care and mobility       Number of Personal Factors/Comorbidities that affect the Plan of Care: Brief history (0):  LOW COMPLEXITY   ASSESSMENT OF OCCUPATIONAL PERFORMANCE[de-identified]   Activities of Daily Living:         Dependent   Basic ADLs (From Assessment) Complex ADLs (From Assessment)   Basic ADL  Feeding: Total assistance  Oral Facial Hygiene/Grooming: Total assistance  Bathing: Total assistance  Upper Body Dressing: Total assistance  Lower Body Dressing: Total assistance  Toileting: Total assistance     Grooming/Bathing/Dressing Activities of Daily Living     Cognitive Retraining  Safety/Judgement: Awareness of environment                       Bed/Mat Mobility  Rolling: Total assistance          Most Recent Physical Functioning:   Gross Assessment:                  Posture:     Balance:    Bed Mobility:  Rolling: Total assistance  Wheelchair Mobility:     Transfers:                       Patient Vitals for the past 6 hrs:       BP BP Patient Position SpO2 Pulse   01/21/17 0820 116/52 At rest 90 % 88        Mental Status  Neurologic State: Alert  Orientation Level: Oriented to person, Oriented to situation  Cognition: Follows commands  Perception: Appears intact  Perseveration: No perseveration noted  Safety/Judgement: Awareness of environment                               Physical Skills Involved:  1.  Range of Motion  2. Mobility  3. Strength  4. Fine or Gross Motor Coordination Cognitive Skills Affected (resulting in the inability to perform in a timely and safe manner):  1. Perceiving  2. Thinking  3. Problem Solving  4. Mental Sequencing  5. Remembering Psychosocial Skills Affected:  1. Environmental Adaptations   Number of elements that affect the Plan of Care: 5+:  HIGH COMPLEXITY   CLINICAL DECISION MAKIN [facility] Mobility Inpatient Short Form  How much help from another person does the patient currently need. .. Total A Lot A Little None   1. Putting on and taking off regular lower body clothing? [X] 1   [ ] 2   [ ] 3   [ ] 4   2. Bathing (including washing, rinsing, drying)? [X] 1   [ ] 2   [ ] 3   [ ] 4   3. Toileting, which includes using toilet, bedpan or urinal?   [X] 1   [ ] 2   [ ] 3   [ ] 4   4. Putting on and taking off regular upper body clothing? [X] 1   [ ] 2   [ ] 3   [ ] 4   5. Taking care of personal grooming such as brushing teeth? [X] 1   [ ] 2   [ ] 3   [ ] 4   6. Eating meals? [X] 1   [ ] 2   [ ] 3   [ ] 4   © , Trustees of [address] [place] [address] [facility], under license to [employer]. All rights reserved    Score:  Initial: 6 Most Recent: X (Date: -- )     Interpretation of Tool:  Represents activities that are increasingly more difficult (i.e. Bed mobility, Transfers, Gait).        Score 24 23 22-20 19-15 14-10 9-7 6       Modifier CH CI CJ CK CL CM CN         · Self Care:               - CURRENT STATUS:    CN - 100% impaired, limited or restricted               - GOAL STATUS:           CN - 100% impaired, limited or restricted               - D/C STATUS:                       CN - 100% impaired, limited or restricted  Payor: [address] [place] [address] / Plan: [address] / Product Type: [employer] Medicare /        Use of outcome tool(s) and clinical judgement create a POC that gives a: LOW COMPLEXITY TREATMENT:   (In addition to Assessment/Re-Assessment sessions the following treatments were rendered)      Pre-treatment Symptoms/Complaints:  Pt asleep upon arrival but aroused easily   Pain: Initial:   Pain Intensity 1:  (pt calling out with movement appearing to be in pain)  Post Session: 1      Assessment/Reassessment only, no treatment provided today     Treatment/Session Assessment:         Response to Treatment:  Tolerated well considering condition.      Interdisciplinary Collaboration:   · Physical Therapist  · Occupational Therapist  · Registered Nurse  · Certified Nursing Assistant/Patient Care Technician     After treatment position/precautions:   · Supine in bed  · Bed in low position  · Caregiver at bedside  · RN notified                  Total Treatment Duration:  OT Patient Time In/Time Out  Time In: 1330  Time Out: 188 [facility] PACO Garcia

## 2017-01-21 NOTE — PROGRESS NOTES
Pt resting quietly without complaints of pain, respirations are even no distress observed. Report received from offgoing shift RN and care assumed at this time.

## 2017-01-21 NOTE — ED NOTES
TRANSFER - OUT REPORT:    Verbal report given to Naty (name) on Mike Meadows  being transferred to 3rd Floor (unit) for routine progression of care       Report consisted of patients Situation, Background, Assessment and   Recommendations(SBAR). Information from the following report(s) SBAR, Kardex and ED Summary was reviewed with the receiving nurse. Lines:   Peripheral IV 01/20/17 Arm (Active)        Opportunity for questions and clarification was provided.       Patient transported with:   [employer]

## 2017-01-21 NOTE — ED PROVIDER NOTES
Patient is a 80 y.o. female presenting with altered mental status. The history is provided by the patient. Altered mental status    This is a new problem. The problem has not changed since onset. Risk factors include dementia. Her past medical history is significant for dementia. Past Medical History:   Diagnosis Date    Anemia 8/22/2012    Arthritis      RA    Cancer (HCC)      colorectal cancer    Chronic pain     Colorectal cancer ([address] 8/22/2012    Gastrointestinal disorder      GERD    GERD (gastroesophageal reflux disease) 8/22/2012    Hypertension 8/22/2012    Memory loss 8/22/2012    Other ill-defined conditions(799.89)      anemia    Urinary incontinence 8/22/2012       Past Surgical History:   Procedure Laterality Date    Hx orthopaedic       macie knee sx, sx left foot    Hx cholecystectomy      Hx hysterectomy      Hx cervical fusion           Family History:   Problem Relation Age of Onset    Heart Disease Father        Social History     Social History    Marital status:      Spouse name: N/A    Number of children: N/A    Years of education: N/A     Occupational History    Not on file. Social History Main Topics    Smoking status: Never Smoker    Smokeless tobacco: Never Used    Alcohol use No    Drug use: No    Sexual activity: Not on file     Other Topics Concern    Not on file     Social History Narrative         ALLERGIES: Pcn [penicillins] and Codeine    Review of Systems   Unable to perform ROS: Dementia       Vitals:    01/20/17 2011   BP: 143/67   Pulse: (!) 121   Resp: 18   Temp: 98.7 °F (37.1 °C)   SpO2: 92%            Physical Exam   Constitutional: She appears well-developed and well-nourished. She appears distressed. HENT:   Head: Normocephalic and atraumatic. Cardiovascular: Intact distal pulses. Pulmonary/Chest: Effort normal.   Abdominal: Soft. She exhibits no distension. There is tenderness. Neurological: She is alert.    Skin: Skin is warm and dry. Large sacral decubitus contaminated with feces. Psychiatric: She has a normal mood and affect. Her behavior is normal.   Nursing note and vitals reviewed. MDM  Number of Diagnoses or Management Options  Decubitus ulcer, unspecified pressure ulcer stage:   Sepsis, due to unspecified organism [facility]):   Urinary tract infection without hematuria, site unspecified:   Diagnosis management comments: Started on broad spectrum antibiotic for skin and urine with PCN allergy. IVF running. Still tachy but crying and upset. Hospitalist notified and will evaluate in ER prior to admission. Cultures sent. Recently discharged from 20 day stay in rehab and only home for 2 days.        Amount and/or Complexity of Data Reviewed  Clinical lab tests: ordered and reviewed (Results for orders placed or performed during the hospital encounter of 01/20/17  -URINE MICROSCOPIC       Result                                            Value                         Ref Range                       WBC                                               >100 (H)                      0 /hpf                          RBC                                               10-20                         0 /hpf                          Epithelial cells                                  3-5                           0 /hpf                          Bacteria                                          4+ (H)                        0 /hpf                          Casts                                             5-10                          0 /lpf                     -CBC WITH AUTOMATED DIFF       Result                                            Value                         Ref Range                       WBC                                               23.8 (H)                      4.3 - 11.1 K/uL                 RBC                                               2.96 (L)                      4.05 - 5.25 M/uL                HGB 8.5 (L)                       11.7 - 15.4 g/dL                HCT                                               26.8 (L)                      35.8 - 46.3 %                   MCV                                               90.5                          79.6 - 97.8 FL                  MCH                                               28.7                          26.1 - 32.9 PG                  MCHC                                              31.7                          31.4 - 35.0 g/dL                RDW                                               15.1 (H)                      11.9 - 14.6 %                   PLATELET                                          256                           150 - 450 K/uL                  MPV                                               12.2                          10.8 - 14.1 FL                  DF                                                PENDING                                                  -METABOLIC PANEL, COMPREHENSIVE       Result                                            Value                         Ref Range                       Sodium                                            136                           136 - 145 mmol/L                Potassium                                         5.0                           3.5 - 5.1 mmol/L                Chloride                                          103                           98 - 107 mmol/L                 CO2                                               25                            21 - 32 mmol/L                  Anion gap                                         8                             7 - 16 mmol/L                   Glucose                                           106 (H)                       65 - 100 mg/dL                  BUN                                               64 (H)                        8 - 23 MG/DL                    Creatinine 1.40 (H)                      0.6 - 1.0 MG/DL                 GFR est AA                                        45 (L)                        >60 ml/min/1.73m2               GFR est non-AA                                    37 (L)                        >60 ml/min/1.73m2               Calcium                                           7.9 (L)                       8.3 - 10.4 MG/DL                Bilirubin, total                                  0.8                           0.2 - 1.1 MG/DL                 ALT                                               16                            12 - 65 U/L                     AST                                               24                            15 - 37 U/L                     Alk. phosphatase                                  239 (H)                       50 - 136 U/L                    Protein, total                                    5.3 (L)                       6.3 - 8.2 g/dL                  Albumin                                           1.7 (L)                       3.2 - 4.6 g/dL                  Globulin                                          3.6 (H)                       2.3 - 3.5 g/dL                  A-G Ratio                                         0.5 (L)                       1.2 - 3.5                  -POC LACTIC ACID       Result                                            Value                         Ref Range                       Lactic Acid (POC)                                 1.3                           0.5 - 1.9 mmol/L           -POC TROPONIN-I       Result                                            Value                         Ref Range                       Troponin-I (POC)                                  0.07                          0.0 - 0.08 ng/ml           -POC TROPONIN-I       Result                                            Value                         Ref Range                       Troponin-I (POC) 0                             0.0 - 0.08 ng/ml           )      ED Course       Procedures

## 2017-01-21 NOTE — PROGRESS NOTES
Problem: Mobility Impaired (Adult and Pediatric)  Goal: *Acute Goals and Plan of Care (Insert Text)      PHYSICAL THERAPY: INITIAL ASSESSMENT, TREATMENT DAY: DAY OF ASSESSMENT, PM 1/21/2017  INPATIENT: Hospital Day: 2  Payor: Tayla Vazquez / Plan: JESSICA. [address] / Product Type: Managed Care Medicare /      NAME/AGE/GENDER: Charmaine Sethi is a 80 y.o. female            PRIMARY DIAGNOSIS: Sepsis ([facility] Sepsis due to Escherichia coli ([facility] Sepsis due to Escherichia coli ([facility]        ICD-10: Treatment Diagnosis:       · Generalized Muscle Weakness (M62.81)   Precaution/Allergies:  Pcn [penicillins] and Codeine       ASSESSMENT:      Ms. Kaiden Charlton presents with decreased strength and decreased active movement lower extremities. Very low muscle tone. Did move her legs around and pt did not even have any trace muscle strength in her legs, would cry out when she was touched or moved. Has pressure sore on her left heel, redness on right heel, sacral decub and another dressing on her left hip. Total assist for rolling. When asked pt she said she could not say the last time she walked and that she did not get in a wheelchair, she only stayed in the bed. No family present to get any further information. Bilateral edema in her lower extremities noted. Per patients description of her prior to admission status and with all of her pressure sores do not feel any PT interventions indicated at this time. This section established at most recent assessment 1. INTERVENTIONS PLANNED: (Benefits and precautions of physical therapy have been discussed with the patient.)  1. none      TREATMENT PLAN: Frequency/Duration: one time visit for initial evaluation                             HISTORY:   History of Present Injury/Illness (Reason for Referral):  PER MD NOTE: Charmaine Sethi is a 80 y.o.  female who presents with AMS. No family at bedside. Attempts to contact family unsuccessful.  Unable to obtain history. In ED found to have sepsis from UTI vs decubitus ulcer. Past Medical History/Comorbidities:   Ms. Shannan Dueñas  has a past medical history of Anemia (8/22/2012); Arthritis; Cancer ([address] Chronic pain; Colorectal cancer ([address] (8/22/2012); Gastrointestinal disorder; GERD (gastroesophageal reflux disease) (8/22/2012); Hypertension (8/22/2012); Memory loss (8/22/2012); Other ill-defined conditions(799.89); and Urinary incontinence (8/22/2012). She also has no past medical history of Aneurysm ([address] Arrhythmia; Asthma; Autoimmune disease ([address] CAD (coronary artery disease); Chronic kidney disease; Coagulation defects; COPD; Diabetes ([address] Endocrine disease; Heart failure ([address] Liver disease; Psychiatric disorder; PUD (peptic ulcer disease); Seizures ([address] Stroke [facility]); Thromboembolus ([address] or Thyroid disease. Ms. Shannan Dueñas  has a past surgical history that includes orthopaedic; cholecystectomy; hysterectomy; and cervical fusion. Social History/Living Environment:   Home Environment: Private residence  # Steps to Enter: 3  One/Two Story Residence: One story  Living Alone: No  Support Systems: Family member(s)  Patient Expects to be Discharged to[de-identified] Private residence  Current DME Used/Available at Home: None  Prior Level of Function/Work/Activity:  Unable to obtain      Number of Personal Factors/Comorbidities that affect the Plan of Care: 1-2: MODERATE COMPLEXITY   EXAMINATION:   Most Recent Physical Functioning:   Gross Assessment:  AROM: Grossly decreased, non-functional  Strength: Grossly decreased, non-functional  Tone: Abnormal  Sensation: Intact               Posture:     Balance:    Bed Mobility:  Rolling: Total assistance  Wheelchair Mobility:     Transfers:     Gait:             Body Structures Involved:  1. Muscles Body Functions Affected:  1. Sensory/Pain Activities and Participation Affected:  1. General Tasks and Demands  2. Mobility  3. Self Care  4.  Community, Social and [place]   Number of elements that affect the Plan of Care: 4+: HIGH COMPLEXITY   CLINICAL PRESENTATION:   Presentation: Stable and uncomplicated: LOW COMPLEXITY   CLINICAL DECISION MAKIN [facility] [address] [address] AM-PAC 6 Clicks   Basic Mobility Inpatient Short Form  How much difficulty does the patient currently have. .. Unable A Lot A Little None   1. Turning over in bed (including adjusting bedclothes, sheets and blankets)? [X] 1   [ ] 2   [ ] 3   [ ] 4   2. Sitting down on and standing up from a chair with arms ( e.g., wheelchair, bedside commode, etc.)   [X] 1   [ ] 2   [ ] 3   [ ] 4   3. Moving from lying on back to sitting on the side of the bed? [X] 1   [ ] 2   [ ] 3   [ ] 4   How much help from another person does the patient currently need. .. Total A Lot A Little None   4. Moving to and from a bed to a chair (including a wheelchair)? [X] 1   [ ] 2   [ ] 3   [ ] 4   5. Need to walk in hospital room? [X] 1   [ ] 2   [ ] 3   [ ] 4   6. Climbing 3-5 steps with a railing? [X] 1   [ ] 2   [ ] 3   [ ] 4   © , Trustees of [address] [facility] [address] [address], under license to [employer]. All rights reserved    Score:  Initial: 6 Most Recent: X (Date: -- )     Interpretation of Tool:  Represents activities that are increasingly more difficult (i.e. Bed mobility, Transfers, Gait). Score 24 23 22-20 19-15 14-10 9-7 6       Modifier CH CI CJ CK CL CM CN         · Mobility - Walking and Moving Around:               - CURRENT STATUS:    CN - 100% impaired, limited or restricted               - GOAL STATUS:           CN - 100% impaired, limited or restricted               - D/C STATUS:                       CN - 100% impaired, limited or restricted  Payor: [address] [place] [address] / Plan: Katlin Garvin / Product Type: [employer] Care Medicare /          Reason for Services/Other Comments:  · none.    Use of outcome tool(s) and clinical judgement create a POC that gives a: Clear prediction of patient's progress: LOW COMPLEXITY                 TREATMENT:   (In addition to Assessment/Re-Assessment sessions the following treatments were rendered)   Pre-treatment Symptoms/Complaints:  Sleeping on contact, alvaro out with any movement  Pain: Initial: could not rate     Post Session:  Could not rate      Treatment/Session Assessment:    · Interdisciplinary Collaboration:  · Occupational Therapist  · Registered Nurse  · Certified Nursing Assistant/Patient Care Technician  · After treatment position/precautions:  · Supine in bed  · Bed in low position  · Call light within reach  · RN notified      · Recommendations/Intent for next treatment session: will discharge PT at this time.   Total Treatment Duration:  PT Patient Time In/Time Out  Time In: 1300  Time Out: [address], PT

## 2017-01-22 PROBLEM — R53.81 DEBILITY: Chronic | Status: ACTIVE | Noted: 2017-01-01

## 2017-01-22 PROBLEM — R78.81 MRSA BACTEREMIA: Status: ACTIVE | Noted: 2017-01-01

## 2017-01-22 PROBLEM — B95.62 MRSA BACTEREMIA: Status: ACTIVE | Noted: 2017-01-01

## 2017-01-22 PROBLEM — A41.02 SEPSIS DUE TO METHICILLIN RESISTANT STAPHYLOCOCCUS AUREUS (MRSA) (HCC): Status: ACTIVE | Noted: 2017-01-01

## 2017-01-22 NOTE — PROGRESS NOTES
Pt crying out. States her legs are killing her.  mg of Norco given PO crushed in applesauce for a FLACC scale rated at a 8. See MAR.

## 2017-01-22 NOTE — PROGRESS NOTES
Problem: Nutrition Deficit  Goal: *Optimize nutritional status  Nutrition  Reason for assessment:  Consult received for General Nutrition Management and Supplements per Dr. Ingris Curtis:   Diet order(s)  Mechanical Soft  Food and Nutrition History:  Pt presented with altered mental status. Unable to obtain nutrition history; pt sleeping, no family present. Skin Status: Stage 4 sacral decubitus  Pertinent Labs:  Low GFR  Pertinentt Rx:  IVF: 0.9% sodium chloride at 75 ml/hr. Anthropometrics:Height: 5' 5\" (165.1 cm), Weight Source: Bed, Weight: 63.5 kg (140 lb), Body mass index is 23.3 kg/(m^2). [facility] BMI class of low normal for Older American Women. Macronutrient needs:  EER:  4582-7712 kcal /day (30-35 kcal/kg BW)-promote wound healing  EPR:  57-68 grams protein/day (1-1.2 grams/kg IBW) GFR 48-increase estimated protein needs as renal function improves  Intake/Comparative Standards: Per RD meal rounds: no lunch consumed. No recorded intake; per nursing staff no breakfast consumed. Patient does not meet estimated macronutrient needs. Nutrition Diagnosis: Increased kcal and protein needs r/t wound healing as evidenced by Stage 4 sacral decubitus. 2.  Inadequate oral intake r/t decreased ability to consume sufficient energy, protein as evidenced by estimates of insufficient intake of energy or high quality protein from diet when compared to requirements. Intervention:  1. Meals and snacks: Continue current diet. 2. Nutritional supplement therapy: Initiated Ensure Enlive 2 X day and magic cup 1 X day.      Nadja Elizabeth, [address] [place] [address], MPH  [phone]

## 2017-01-22 NOTE — PROGRESS NOTES
Pt report groin pain. 5-325 mg of Norco given PO crushed and mixed with applesauce for a FLACC scale of 4. See MAR.

## 2017-01-22 NOTE — PROGRESS NOTES
Pharmacokinetic Consult to Pharmacist    Khang Fe is a 80 y.o. female being treated for bacteremia with Vancomycin. @MARISELA(56)@  @[facility](52)@  Lab Results   Component Value Date/Time    BUN 58 01/21/2017 06:30 AM    Creatinine 1.16 01/21/2017 06:30 AM    WBC 16.1 01/22/2017 06:15 AM      Estimated Creatinine Clearance: 26.7 mL/min (based on Cr of 1.16). CULTURES:  Blood cultures positive for MRSA  Nasal swab positive    No results found for: Galen Ceron    Day 2 of vancomycin. Goal trough is 15 - 20. Will continue with Vancomycin 1 gram IV every 24 hours. Will continue to follow patient.       Thank you,  Chrissy Niño PharmD, BCPS

## 2017-01-22 NOTE — PROGRESS NOTES
Lab called and reported pt's blood culture was positive for MRSA. Dr. Lily Robertson notified. New order received to restart Vancomycin. Order placed and Derek Montelongo in pharmacy notified.

## 2017-01-22 NOTE — PROGRESS NOTES
Hospitalist Progress Note     Admit Date:  2017  8:08 PM   Name:  Obdulia Blair   Age:  80 y.o.  :  1/15/1923   MRN:  [id number]   PCP:  Kehinde Doe MD  Treatment Team: Attending Provider: Cayetano Ahumada, DO    Subjective:   Pt admitted with sepsis from UTI, started on abx and IVF. In last few years she has had general decline in quality of life and functional status, along with issues with memory loss. Recent admission and discharge for UTI in last month also.  - pt altered so cannot obtain ROS. She does mumble in response to touch and loud voice     - pt more alert today. She is still confused, uncertain of her baseline but she is pleasant, not in distress. Denies CP, SOB, diarrhea. Objective:     Patient Vitals for the past 24 hrs:   Temp Pulse Resp BP SpO2   17 0259 97.2 °F (36.2 °C) 98 15 117/70 92 %   17 2233 97.5 °F (36.4 °C) 99 16 127/61 92 %   17 1849 97.1 °F (36.2 °C) 96 16 108/57 92 %   17 1659 - - - 117/67 -   17 1527 97.1 °F (36.2 °C) 87 14 100/46 -   17 0820 96 °F (35.6 °C) 88 16 116/52 90 %     Oxygen Therapy  O2 Sat (%): 92 % (17 0259)  Pulse via Oximetry: 82 beats per minute (174)  O2 Device: Room air (17)  No intake or output data in the 24 hours ending 17 0728    General:    Frail. No apparent distress. CV:   RRR. No murmur, rub, or gallop. Lungs:   Clear to auscultation bilaterally. No wheezing, rhonchi, or rales. Abdomen:   Soft, nontender, nondistended. Bowel sounds normal.   Extremities: Warm and dry. No cyanosis. BLE edema   Skin:     No rashes or jaundice.      Current Meds:  Current Facility-Administered Medications   Medication Dose Route Frequency    vancomycin (VANCOCIN) 1,000 mg in 0.9% sodium chloride (MBP/ADV) 250 mL  1,000 mg IntraVENous Q24H    sodium polystyrene (KAYEXALATE) 15 gram/60 mL oral suspension 15 g  15 g Oral NOW    acetaminophen (TYLENOL) tablet 650 mg  650 mg Oral Q6H PRN    ondansetron (ZOFRAN) injection 4 mg  4 mg IntraVENous Q6H PRN    haloperidol lactate (HALDOL) injection 2 mg  2 mg IntraVENous Q4H PRN    senna-docusate (PERICOLACE) 8.6-50 mg per tablet 2 Tab  2 Tab Oral DAILY PRN    bisacodyl (DULCOLAX) tablet 5 mg  5 mg Oral DAILY PRN    mupirocin (BACTROBAN) 2 % ointment   Topical BID    HYDROcodone-acetaminophen (NORCO)  mg tablet 1 Tab  1 Tab Oral Q4H PRN    HYDROcodone-acetaminophen (NORCO) 5-325 mg per tablet 1 Tab  1 Tab Oral Q4H PRN    levoFLOXacin (LEVAQUIN) 750 mg in D5W IVPB  750 mg IntraVENous Q48H    sodium chloride (NS) flush 5 mL  5 mL InterCATHeter Q8H    sodium chloride (NS) flush 5-10 mL  5-10 mL InterCATHeter PRN    0.9% sodium chloride infusion  75 mL/hr IntraVENous CONTINUOUS    heparin (porcine) injection 5,000 Units  5,000 Units SubCUTAneous Q8H       Labs and Studies:  I have reviewed all labs, meds, telemetry events, and studies from the last 24 hours. Recent Results (from the past 24 hour(s))   CBC WITH AUTOMATED DIFF    Collection Time: 01/22/17  6:15 AM   Result Value Ref Range    WBC 16.1 (H) 4.3 - 11.1 K/uL    RBC 2.98 (L) 4.05 - 5.25 M/uL    HGB 8.4 (L) 11.7 - 15.4 g/dL    HCT 26.7 (L) 35.8 - 46.3 %    MCV 89.6 79.6 - 97.8 FL    MCH 28.2 26.1 - 32.9 PG    MCHC 31.5 31.4 - 35.0 g/dL    RDW 15.3 (H) 11.9 - 14.6 %    PLATELET 875 728 - 529 K/uL    MPV 12.0 10.8 - 14.1 FL    DF AUTOMATED      NEUTROPHILS 89 (H) 43 - 78 %    LYMPHOCYTES 8 (L) 13 - 44 %    MONOCYTES 3 (L) 4.0 - 12.0 %    EOSINOPHILS 0 (L) 0.5 - 7.8 %    BASOPHILS 0 0.0 - 2.0 %    IMMATURE GRANULOCYTES 0.3 0.0 - 5.0 %    ABS. NEUTROPHILS 14.4 (H) 1.7 - 8.2 K/UL    ABS. LYMPHOCYTES 1.2 0.5 - 4.6 K/UL    ABS. MONOCYTES 0.4 0.1 - 1.3 K/UL    ABS. EOSINOPHILS 0.0 0.0 - 0.8 K/UL    ABS. BASOPHILS 0.0 0.0 - 0.2 K/UL    ABS. IMM.  GRANS. 0.1 0.0 - 0.5 K/UL        Recent Imaging:  CXR Results  (Last 48 hours)    None        CT Results  (Last 48 hours) None          Assessment and Plan:     Hospital Problems as of 1/22/2017  Date Reviewed: 9/26/2016          Codes Class Noted - Resolved POA    MRSA bacteremia ICD-10-CM: R78.81  ICD-9-CM: 790.7, 041.12  1/22/2017 - Present Yes        Debility ICD-10-CM: R53.81  ICD-9-CM: 799.3  1/21/2017 - Present Yes        Dementia without behavioral disturbance (Chronic) ICD-10-CM: F03.90  ICD-9-CM: 294.20  1/21/2017 - Present Yes        Recurrent UTI ICD-10-CM: N39.0  ICD-9-CM: 599.0  1/21/2017 - Present Yes        * (Principal)Sepsis due to methicillin resistant Staphylococcus aureus (MRSA) ([facility] ICD-10-CM: A41.02  ICD-9-CM: 038.12, 995.91  1/20/2017 - Present Yes        Pressure ulcer, stage 4 ([facility] ICD-10-CM: L89.94  ICD-9-CM: 707.00, 707.24  1/20/2017 - Present Yes        Acute cystitis without hematuria ICD-10-CM: N30.00  ICD-9-CM: 595.0  12/26/2016 - Present Yes        Chronic pain (Chronic) ICD-10-CM: G89.29  ICD-9-CM: 338.29  4/21/2013 - Present Yes    Overview Signed 4/21/2013  3:59 PM by Michael Wheeler MD     Shoulders, feet, back             RESOLVED: Tachycardia ICD-10-CM: R00.0  ICD-9-CM: 785.0  1/20/2017 - 1/21/2017 Yes    Overview Signed 1/20/2017  9:25 PM by DO ANNABEL Cottrell                     PLAN:    · Add vanc to levaquin. Allergic to PCNs. Blood cultures with MRSA. Urine culture pending. UA positive on admission  · Check 2d echo for MRSA bacteremia  · Palliative care consulted for goals of care given age, recent general decline, chronic pain, multiple admissions  · Cont PT/OT  · Cont wound care    DC planning:  Pending. Pt came from SNF.         Signed:  Kaela Dennison MD

## 2017-01-22 NOTE — PROGRESS NOTES
Assessment complete via flow sheet. Patient drowsy unless touch or moved then she yells out at staff. Pt oriented to self only. HR regular. Lung sounds clear. Pt has large ulcer to sacrum with small open areas noted. Allevyn dressing in place on sacrum and left hip. Small dark red areas noted over spine. Deep tissue injury noted to R heel. Heels floated. +4 pitting edema noted to BLE. Patient incontinent of dale malodorous urine. Bed low/locked with call light with in reach. Bed alarm in place.

## 2017-01-23 NOTE — PROGRESS NOTES
Hospitalist Progress Note     Admit Date:  2017  8:08 PM   Name:  Villa Wiley   Age:  80 y.o.  :  1/15/1923   MRN:  [id number]   PCP:  Mert Trimble MD  Treatment Team: Attending Provider: Halley Guerin DO; Consulting Provider: kAua Ortega NP    Subjective:   Pt admitted with sepsis from UTI, started on abx and IVF. In last few years she has had general decline in quality of life and functional status, along with issues with memory loss. Recent admission and discharge for UTI in last month also.  - pt altered so cannot obtain ROS. She does mumble in response to touch and loud voice   - pt more alert today. She is still confused, uncertain of her baseline but she is pleasant, not in distress. Denies CP, SOB, diarrhea.  - still confused, probably baseline. Oriented to self. No family at bedside. Denies complaints. Very frail looking. Objective:     Patient Vitals for the past 24 hrs:   Temp Pulse Resp BP SpO2   17 1050 96 °F (35.6 °C) 83 18 129/58 94 %   17 0728 96 °F (35.6 °C) 90 18 114/51 95 %   17 0410 98.3 °F (36.8 °C) 89 18 128/61 94 %   17 2240 98 °F (36.7 °C) 83 16 137/58 94 %   17 1855 96 °F (35.6 °C) 87 16 121/64 95 %   17 1430 96 °F (35.6 °C) 91 16 110/52 95 %     Oxygen Therapy  O2 Sat (%): 94 % (17 1050)  Pulse via Oximetry: 82 beats per minute (17 2134)  O2 Device: Room air (17)    Intake/Output Summary (Last 24 hours) at 17 1311  Last data filed at 17 0451   Gross per 24 hour   Intake             2899 ml   Output                0 ml   Net             2899 ml       General:    Frail. No apparent distress. CV:   RRR. No murmur, rub, or gallop. Lungs:   Clear to auscultation bilaterally. No wheezing, rhonchi, or rales. Abdomen:   Soft, nontender, nondistended. Bowel sounds normal.   Extremities: Warm and dry. BLE edema   Skin:     No rashes or jaundice.      Current Meds:  Current Facility-Administered Medications   Medication Dose Route Frequency    0.9% sodium chloride infusion  125 mL/hr IntraVENous CONTINUOUS    [START ON 1/24/2017] VANCOMYCIN INFORMATION NOTE   Other ONCE    vancomycin (VANCOCIN) 1,000 mg in 0.9% sodium chloride (MBP/ADV) 250 mL  1,000 mg IntraVENous Q24H    acetaminophen (TYLENOL) tablet 650 mg  650 mg Oral Q6H PRN    ondansetron (ZOFRAN) injection 4 mg  4 mg IntraVENous Q6H PRN    haloperidol lactate (HALDOL) injection 2 mg  2 mg IntraVENous Q4H PRN    senna-docusate (PERICOLACE) 8.6-50 mg per tablet 2 Tab  2 Tab Oral DAILY PRN    bisacodyl (DULCOLAX) tablet 5 mg  5 mg Oral DAILY PRN    mupirocin (BACTROBAN) 2 % ointment   Topical BID    HYDROcodone-acetaminophen (NORCO)  mg tablet 1 Tab  1 Tab Oral Q4H PRN    HYDROcodone-acetaminophen (NORCO) 5-325 mg per tablet 1 Tab  1 Tab Oral Q4H PRN    levoFLOXacin (LEVAQUIN) 750 mg in D5W IVPB  750 mg IntraVENous Q48H    sodium chloride (NS) flush 5 mL  5 mL InterCATHeter Q8H    sodium chloride (NS) flush 5-10 mL  5-10 mL InterCATHeter PRN    heparin (porcine) injection 5,000 Units  5,000 Units SubCUTAneous Q8H       Labs and Studies:  I have reviewed all labs, meds, telemetry events, and studies from the last 24 hours. Recent Results (from the past 24 hour(s))   CBC WITH AUTOMATED DIFF    Collection Time: 01/23/17  6:25 AM   Result Value Ref Range    WBC 12.8 (H) 4.3 - 11.1 K/uL    RBC 2.85 (L) 4.05 - 5.25 M/uL    HGB 7.9 (L) 11.7 - 15.4 g/dL    HCT 25.8 (L) 35.8 - 46.3 %    MCV 90.5 79.6 - 97.8 FL    MCH 27.7 26.1 - 32.9 PG    MCHC 30.6 (L) 31.4 - 35.0 g/dL    RDW 14.9 (H) 11.9 - 14.6 %    PLATELET 160 169 - 797 K/uL    MPV 11.9 10.8 - 14.1 FL    DF AUTOMATED      NEUTROPHILS 88 (H) 43 - 78 %    LYMPHOCYTES 8 (L) 13 - 44 %    MONOCYTES 4 4.0 - 12.0 %    EOSINOPHILS 0 (L) 0.5 - 7.8 %    BASOPHILS 0 0.0 - 2.0 %    IMMATURE GRANULOCYTES 0.4 0.0 - 5.0 %    ABS.  NEUTROPHILS 11.2 (H) 1.7 - 8.2 K/UL    ABS. LYMPHOCYTES 1.0 0.5 - 4.6 K/UL    ABS. MONOCYTES 0.5 0.1 - 1.3 K/UL    ABS. EOSINOPHILS 0.0 0.0 - 0.8 K/UL    ABS. BASOPHILS 0.0 0.0 - 0.2 K/UL    ABS. IMM.  GRANS. 0.1 0.0 - 0.5 K/UL   METABOLIC PANEL, BASIC    Collection Time: 01/23/17  6:25 AM   Result Value Ref Range    Sodium 141 136 - 145 mmol/L    Potassium 4.3 3.5 - 5.1 mmol/L    Chloride 109 (H) 98 - 107 mmol/L    CO2 23 21 - 32 mmol/L    Anion gap 9 7 - 16 mmol/L    Glucose 129 (H) 65 - 100 mg/dL    BUN 57 (H) 8 - 23 MG/DL    Creatinine 1.27 (H) 0.6 - 1.0 MG/DL    GFR est AA 50 (L) >60 ml/min/1.73m2    GFR est non-AA 42 (L) >60 ml/min/1.73m2    Calcium 7.8 (L) 8.3 - 10.4 MG/DL        Recent Imaging:  CXR Results  (Last 48 hours)    None        CT Results  (Last 48 hours)    None          Assessment and Plan:     Hospital Problems as of 1/23/2017  Date Reviewed: 9/26/2016          Codes Class Noted - Resolved POA    MRSA bacteremia ICD-10-CM: R78.81  ICD-9-CM: 790.7, 041.12  1/22/2017 - Present Yes        Debility ICD-10-CM: R53.81  ICD-9-CM: 799.3  1/21/2017 - Present Yes        Dementia without behavioral disturbance (Chronic) ICD-10-CM: F03.90  ICD-9-CM: 294.20  1/21/2017 - Present Yes        Recurrent UTI ICD-10-CM: N39.0  ICD-9-CM: 599.0  1/21/2017 - Present Yes        * (Principal)Sepsis due to methicillin resistant Staphylococcus aureus ([facility]SA) ([facility] ICD-10-CM: A41.02  ICD-9-CM: 038.12, 995.91  1/20/2017 - Present Yes        Pressure ulcer, stage 4 (HCC) ICD-10-CM: L89.94  ICD-9-CM: 707.00, 707.24  1/20/2017 - Present Yes        Acute cystitis without hematuria ICD-10-CM: N30.00  ICD-9-CM: 595.0  12/26/2016 - Present Yes        Chronic pain (Chronic) ICD-10-CM: B07.05  ICD-9-CM: 338.29  4/21/2013 - Present Yes    Overview Signed 4/21/2013  3:59 PM by Bassam Otero MD     Shoulders, feet, back             RESOLVED: Tachycardia ICD-10-CM: R00.0  ICD-9-CM: 785.0  1/20/2017 - 1/21/2017 Yes    Overview Signed 1/20/2017  9:25 PM by Aliyah James DO [facility]     ST                     PLAN:    · Pt improving from a UTI standpoint; second dose of IV abx for MRSA UTI and bacteremia today and she could transition to oral given improvement, with discharge later. · However overall prognosis remains poor. She is mostly bedridden and will likely continue to get UTIs at SNF. She had a UTI 2 weeks ago and was treated with appropriate abx here. Per report pt soils herself frequently and hygiene is poor. Will await palliative care recs  · Cont PT/OT  · Cont wound care    DC planning:  Pending. Pt came from home. Needs ppd for 2 days if going back to SNF. Hopefully family will opt for hospice.       Signed:  Roxy Rain MD

## 2017-01-23 NOTE — PROGRESS NOTES
Spoke with Dr. Agustin Gandhi to express concern over amount of edema present on pt and decreasing urine out put. Pt has +4 pitting edema in BLE and non pitting edema in BUE and upper back. New orders received.

## 2017-01-23 NOTE — CONSULTS
Palliative Care    Patient: Atul Brizuela MRN: [id number]  SSN: [id number]    YOB: 1923  Age: 80 y.o. Sex: female       Date of Request: 1/21/2017  Date of Consult:  1/23/2017  Reason for Consult:  advanced care plan planning/end of life and medical decision making  Requesting Physician: Dr. Naseem Ramon     Assessment/Plan:     Principal Diagnosis:    Debility, Unspecified  R53.81-ongoing, expect to worsen. Additional Diagnoses:   · Altered Mental Status R41.82-ongoing  · Counseling, Encounter for Medical Advice  Z71.9-unable to contact family; phone calls x2  · Encounter for Palliative Care  Z51.5    Palliative Performance Scale (PPS):   30-40%    Medical Decision Making:   Reviewed and summarized H&P and notes since admission. Discussed case with appropriate providers: Dr. Naseem Ramon and SW.  Reviewed laboratory and x-ray data. From a Palliative Care perspective, I feel that pt is certainly appropriate for hospice. I would recommend a DNR status. Unfortunately, I have called both numbers listed for family with no answer. I would expect further hospitalizations with UTI in the future given pt history. Spoke with pt daughter via telephone. She states she will be at the hospital sometime today, possibly 1145-12 noon. States she is having difficulty caring for patient in the home, but that pt can not afford long term care. Discussed the option of hospice in the home. Daughter having difficulty hearing me on the phone. Will need to speak further with her when she arrives. Also need to discuss code status. Returned to have family meeting with pt daughter in [facility] and BRITTNEY Hernández  Per KELI, pt does not have HCPOA. Pt son here in [place] is one of 4 adult children, 2 of whom are not involved. There is another son who is out of town but aware of pt condition. KELI states that they can not afford long term placement.   Discussed home with hospice, which I feel is the best option at this point. Explained that pt is likely to have ongoing recurrent infections, decreased appetite, difficulty healing wounds. Explained that IVF are generally not part of the hospice treatment plan. Explained the possibility of IVF causing fluid overload in patients at EOL. Daughter in law tearful. She voices understanding at all of the above. We also discussed code status and she states she feels that pt would not want to be a FULL CODE, but again, she does not have any decision making ability for pt without a HCPOA. States that her , pt son, is at home. Agreed that she AND her  can be here tomorrow at 11am to meet with PC, SW, and hopefully hospitalist as well. Will work to have pt other children on speaker phone as well. DIL is aware that palliative care recommendations are:  Hospice care  DNR status. Will discuss findings with members of the interdisciplinary team.      Thank you for this referral.   The Palliative Care team is available from 8 am to 4:30 pm Monday-Friday. Medical management during other hours is per the primary attending service. .    Subjective:     History obtained from:  Chart    Chief Complaint: recurrent UTI/debility/ams  History of Present Illness: This is a 80year old female admitted 1/20/2017 from home. Had just been DC'd from [facility] on 1/18/23017. Pt found to be septic from UTI vs decubitus ulcer. Pt was hospitalized December 2016 for UTI as well. Currently, pt denies pain or discomfort. Pt is pleasantly confused. She does not know where she is. She asks me where my sister is. Attempts have been unsuccessful at contacting any family members. Advance Directive: No       Code Status:  Full Code            Health Care Power of : No - Patient does not have a [address]     Past Medical History   Diagnosis Date    Anemia 8/22/2012    Arthritis      RA    Cancer ([facility]      colorectal cancer    Chronic pain     Colorectal cancer ([facility] 8/22/2012    Gastrointestinal disorder      GERD    GERD (gastroesophageal reflux disease) 8/22/2012    Hypertension 8/22/2012    Memory loss 8/22/2012    Other ill-defined conditions(799.89)      anemia    Urinary incontinence 8/22/2012      Past Surgical History   Procedure Laterality Date    Hx orthopaedic       macie knee sx, sx left foot    Hx cholecystectomy      Hx hysterectomy      Hx cervical fusion       Family History   Problem Relation Age of Onset    Heart Disease Father       Social History   Substance Use Topics    Smoking status: Never Smoker    Smokeless tobacco: Never Used    Alcohol use No     Prior to Admission medications    Medication Sig Start Date End Date Taking? Authorizing Provider   Morphine (RAFAELA) 10 mg SR capsule Take 10 mg by mouth daily. Yes Phys Other, MD   HYDROcodone-acetaminophen (NORCO)  mg tablet Take 1 Tab by mouth every six (6) hours as needed for Pain. Yes Phys Other, MD   brief disposable (ADULT BRIEFS - LARGE) misc by Does Not Apply route. 9/29/14   Constantine Osuna MD       Allergies   Allergen Reactions    Pcn [Penicillins] Anaphylaxis    Codeine Nausea Only        Review of Systems:  A comprehensive review of systems was not obtained due to pt ams. Pt does deny pain or any discomfort. Objective:     Visit Vitals    /51 (BP 1 Location: Left arm, BP Patient Position: At rest)    Pulse 90    Temp 96 °F (35.6 °C)    Resp 18    Ht 5' 5\" (1.651 m)    Wt 63.5 kg (140 lb)    SpO2 95%    BMI 23.3 kg/m2        Physical Exam:    General:  Cooperative. No acute distress. Frail, debilitated. Eyes:  Conjunctivae/corneas clear    Nose: Nares normal. Septum midline.    Neck: Supple, symmetrical, trachea midline, no JVD   Lungs:   unlabored   Heart:  Regular rate and rhythm, no murmur    Abdomen:   Soft, non-tender, non-distended   Extremities: Normal, atraumatic, no cyanosis; bilateral lower extremity edema   Skin: Skin color-pale, texture, turgor normal. R heel bruising/boggy   Neurologic: Nonfocal   Psych: Alert and disoriented      Assessment:     Hospital Problems  Date Reviewed: 9/26/2016          Codes Class Noted POA    MRSA bacteremia ICD-10-CM: R78.81  ICD-9-CM: 790.7, 041.12  1/22/2017 Yes        Debility ICD-10-CM: R53.81  ICD-9-CM: 799.3  1/21/2017 Yes        Dementia without behavioral disturbance (Chronic) ICD-10-CM: F03.90  ICD-9-CM: 294.20  1/21/2017 Yes        Recurrent UTI ICD-10-CM: N39.0  ICD-9-CM: 599.0  1/21/2017 Yes        * (Principal)Sepsis due to methicillin resistant Staphylococcus aureus (MRSA) ([facility] ICD-10-CM: A41.02  ICD-9-CM: 038.12, 995.91  1/20/2017 Yes        Pressure ulcer, stage 4 ([facility] ICD-10-CM: L89.94  ICD-9-CM: 707.00, 707.24  1/20/2017 Yes        Acute cystitis without hematuria ICD-10-CM: N30.00  ICD-9-CM: 595.0  12/26/2016 Yes        Chronic pain (Chronic) ICD-10-CM: L22.16  ICD-9-CM: 338.29  4/21/2013 Yes    Overview Signed 4/21/2013  3:59 PM by Michael Wheeler MD     Shoulders, feet, back                   Signed By: Rubi Pruett NP     January 23, 2017

## 2017-01-23 NOTE — WOUND CARE
Patient seen while getting bath, sacral wound from home. Has large 7x10 eschar,kelsey wound redness and  foul odor that is noticeable outside of her room. Black soft discoloration along gluteal fold and around anus. Noted palliative care note and recommendations. Patient wound benefit from surgical debridement. Is on air mattress but will step up to envision low air loss. Wound team will continue to monitor and assist with treatment plan. Spoke with Dr Carlito Trammell, he wanted to wait on surgical consult until goals of care are established.

## 2017-01-23 NOTE — PROGRESS NOTES
Problem: Interdisciplinary Rounds  Goal: Interdisciplinary Rounds  Interdisciplinary team rounds were held 1/23/2017 with the following team members:Care Management, Nursing, Nurse Practitioner, Nutrition, Pharmacy and Physician and the patient was not able to participate. Plan of care discussed. See clinical pathway and/or care plan for interventions and desired outcomes.

## 2017-01-23 NOTE — PROGRESS NOTES
Care Management Interventions  Mode of Transport at Discharge: Other (see comment)  Transition of Care Consult (CM Consult): Discharge Planning  Current Support Network: Lives with Caregiver  Confirm Follow Up Transport: Other (see comment)  Discharge Location  Discharge Placement: Home with hospice     1-24-17   BRITTNEY and Li ( NP palliative care ) met with family. Family has decided to take pt home with hospice. Family then met with [facility] liaison. Pt is scheduled to d/c after DME delivery on Wed. Transport arranged for 5:30 on Wed. Referral per MD.  D/C planning. BRITTNEY and Jason Bee ( NP Palliative care ) spoke with pt's d-in-law Gilbert Ro [phone] ). Pt lives with her family ( son Jovanny Trammell, d-in-law  Ye Verde ). Pt has 3 other children ( a daughter and son who do not stay in touch with pt ) another son José Pierce ( he lives in [facility] ). Pt's home is in her name and son José Pierce. Ye Verde states son Estuardo Dodge ) handles pt's finances and she was unable to provide any info. Pt was at a SNF for approx 3 weeks discharged on 1-18 and was admitted back to the hospital 2 days later. NP discussed goals of care with d-in-law and we scheduled a f/u meeting, which will include sons for 11am Tues. BRITTNEY provided d-in-law with phone # to contact SNF liaison to voice her concerns with quality of care at [facility].

## 2017-01-23 NOTE — PROGRESS NOTES
Assessment complete via flow sheet. Patient drowsy unless touch or moved then she yells out at staff. Pt oriented to self only. HR regular. Lung sounds clear. Pt has large ulcer to sacrum with small open areas noted. Allevyn dressing in place on sacrum and left hip. Small dark red areas noted over spine. Deep tissue injury noted to R heel. Heels floated. +4 pitting edema noted to BLE. Non pitting edema noted to BUE and upper back. Bed low/locked with call light with in reach. Bed alarm in place.

## 2017-01-24 NOTE — PROGRESS NOTES
Pt bean irrigated with 10ml NS per order. Small clot removed and urine started to flow. 800ml emptied. NAD noted. Will monitor.

## 2017-01-24 NOTE — PROGRESS NOTES
Pt's urine continues to be the same thick and dark red. Dr. Aletha Last called and orders received for irrigation as needed and H/H for at 8p and 4am as well. Will continue to monitor.

## 2017-01-24 NOTE — PROGRESS NOTES
Pt assessment completed. Pt in bed, turned on left side. Pillow placed between legs for comfort. Pt with edema over body. NAD noted. Hill with bloody urine in bag. Hill emptied and now urine pink/yellow and clear looking in tubing. Will monitor. Call light and essentials within reach. Door open. Will monitor.

## 2017-01-24 NOTE — PROGRESS NOTES
Palliative Care Progress Note    Patient: Mackenzie Small MRN: [id number]  SSN: [id number]    YOB: 1923  Age: 80 y.o. Sex: female       Assessment/Plan:     Chief Complaint/Interval History:  Sleeping, appears comfortable       Principal Diagnosis:    · Debility, Unspecified  R53.81    Additional Diagnoses:   · Altered Mental Status R41.82  · Failure to Thrive  R62.7  · Fatigue, Lethargy  R53.83  · Frailty  R54  · Counseling, Encounter for Medical Advice  Z71.9  · Encounter for Palliative Care  Z51.5    Palliative Performance Scale (PPS)       Medical Decision Making:   Reviewed and summarized notes over last 24 hours   Discussed case with appropriate providersBRITTNEY Saeed  Reviewed laboratory and x-ray data- CBC, BMP    Pt sleeping, appears comfortable. She is easily awakened, and remains confused. Met with pt's son, Omar Boyd, his wife, Anmol Gin, pt's other son, Moose Campos via phone, and Jos Rutledge.  Updated them on pt's condition and recommendation for hospice care at home. Family is receptive to Hospice at home, with transition to [place] in the future if needed. Family also in agreement with DNR status, stating pt had made it clear in the past that she did not want resuscitative efforts. DNR order and Hospice consult placed. Anmol Ruth liaison, to meet with family at noon today. Will continue to follow. Will discuss findings with members of the interdisciplinary team.         More than 50% of this 35 minute visit was spent counseling and coordination of care as outlined above. Subjective:     Review of Systems:  Review of systems not obtained due to patient factors- AMS      Objective:     Visit Vitals    /64 (BP 1 Location: Left arm, BP Patient Position: At rest)    Pulse 96    Temp 98.5 °F (36.9 °C)    Resp 18    Ht 5' 5\" (1.651 m)    Wt 63.5 kg (140 lb)    SpO2 94%    BMI 23.3 kg/m2       Physical Exam:    General:  Sleeping, debilitated and frail. No acute distress.    Eyes: Conjunctivae/corneas clear    Nose: Nares normal. Septum midline. Neck: Supple, symmetrical, trachea midline   Lungs:   Decreased bilaterally unlabored   Heart:  Regular rate and rhythm   Abdomen:   Soft, non-tender, non-distended   Extremities: Normal, atraumatic, no cyanosis.  BLE edema   Skin: Skin color, texture, turgor normal.   Neurologic: Nonfocal   Psych: Confused      Signed By: Noy Hardin NP     January 24, 2017

## 2017-01-24 NOTE — PROGRESS NOTES
Dr. Jed Arreguin has been unsuccessful at contacting family regarding Hgb. States to order Hgb recheck at 0200 and d/c fluid orders as well.

## 2017-01-24 NOTE — PROGRESS NOTES
Patient resting in bed with no complaints. Lynn Toney when moved. alyveen to left hip and sacrum. Right heel still deep red in color. Edema noted to all extremities. Saline well intact to right upper arm. Incontinent of bowel and bladder. Brief in place. Hill to bedside drain with some blood noted. Hill draining good.

## 2017-01-24 NOTE — PROGRESS NOTES
Hospitalist Progress Note     Admit Date:  2017  8:08 PM   Name:  Khushbu Alford   Age:  80 y.o.  :  1/15/1923   MRN:  [id number]   PCP:  Kris Bruce MD  Treatment Team: Attending Provider: Jeremiah Roque DO; Consulting Provider: Ana Maria Caba NP; Utilization Review: Villa Cordero RN; Care Coordinator: ROSALIO Sylvester    Subjective:   Pt admitted with sepsis from UTI, started on abx and IVF. In last few years she has had general decline in quality of life and functional status, along with issues with memory loss. Recent admission and discharge for UTI in last month also.  - pt altered so cannot obtain ROS. She does mumble in response to touch and loud voice   - pt more alert today. She is still confused, uncertain of her baseline but she is pleasant, not in distress. Denies CP, SOB, diarrhea.  - still confused, probably baseline. Oriented to self. No family at bedside. Denies complaints. Very frail looking.  - mental status about the same. Confused, altered. Frail. Sleeping on entry. Unable to obtain ROS      Objective:     Patient Vitals for the past 24 hrs:   Temp Pulse Resp BP SpO2   17 1152 97.1 °F (36.2 °C) 87 18 122/50 95 %   17 0745 98.5 °F (36.9 °C) 96 18 123/64 94 %   17 0330 98.8 °F (37.1 °C) 93 19 114/66 93 %   17 2300 98 °F (36.7 °C) 90 18 126/67 96 %   17 - - - - 95 %   17 1900 97.3 °F (36.3 °C) 83 18 140/83 95 %   17 1554 97.3 °F (36.3 °C) 89 18 124/69 95 %     Oxygen Therapy  O2 Sat (%): 95 % (17 1152)  Pulse via Oximetry: 74 beats per minute (17)  O2 Device: Nasal cannula (17)  O2 Flow Rate (L/min): 2 l/min (01/23/17 2158)    Intake/Output Summary (Last 24 hours) at 17 1314  Last data filed at 17 1013   Gross per 24 hour   Intake             1731 ml   Output             3035 ml   Net            -1304 ml       General:    Frail.  No apparent distress. CV:   RRR. No murmur, rub, or gallop. Lungs:   Clear to auscultation bilaterally. No wheezing, rhonchi, or rales. Abdomen:   Soft, nontender, nondistended. Bowel sounds normal.   Extremities: Warm and dry. BLE edema   Skin:     No rashes or jaundice. Current Meds:  Current Facility-Administered Medications   Medication Dose Route Frequency    vancomycin (VANCOCIN) 1,000 mg in 0.9% sodium chloride (MBP/ADV) 250 mL  1,000 mg IntraVENous Q24H    acetaminophen (TYLENOL) tablet 650 mg  650 mg Oral Q6H PRN    ondansetron (ZOFRAN) injection 4 mg  4 mg IntraVENous Q6H PRN    haloperidol lactate (HALDOL) injection 2 mg  2 mg IntraVENous Q4H PRN    senna-docusate (PERICOLACE) 8.6-50 mg per tablet 2 Tab  2 Tab Oral DAILY PRN    bisacodyl (DULCOLAX) tablet 5 mg  5 mg Oral DAILY PRN    mupirocin (BACTROBAN) 2 % ointment   Topical BID    HYDROcodone-acetaminophen (NORCO)  mg tablet 1 Tab  1 Tab Oral Q4H PRN    HYDROcodone-acetaminophen (NORCO) 5-325 mg per tablet 1 Tab  1 Tab Oral Q4H PRN    sodium chloride (NS) flush 5 mL  5 mL InterCATHeter Q8H    sodium chloride (NS) flush 5-10 mL  5-10 mL InterCATHeter PRN       Labs and Studies:  I have reviewed all labs, meds, telemetry events, and studies from the last 24 hours.   Recent Results (from the past 24 hour(s))   HGB & HCT    Collection Time: 01/23/17  9:08 PM   Result Value Ref Range    HGB 6.3 (LL) 11.7 - 15.4 g/dL    HCT 20.4 (LL) 35.8 - 46.3 %   CBC WITH AUTOMATED DIFF    Collection Time: 01/24/17  2:35 AM   Result Value Ref Range    WBC 12.9 (H) 4.3 - 11.1 K/uL    RBC 2.55 (L) 4.05 - 5.25 M/uL    HGB 7.2 (L) 11.7 - 15.4 g/dL    HCT 22.6 (L) 35.8 - 46.3 %    MCV 88.6 79.6 - 97.8 FL    MCH 28.2 26.1 - 32.9 PG    MCHC 31.9 31.4 - 35.0 g/dL    RDW 15.3 (H) 11.9 - 14.6 %    PLATELET 282 102 - 731 K/uL    MPV 11.9 10.8 - 14.1 FL    DF AUTOMATED      NEUTROPHILS 88 (H) 43 - 78 %    LYMPHOCYTES 9 (L) 13 - 44 %    MONOCYTES 3 (L) 4.0 - 12.0 % EOSINOPHILS 0 (L) 0.5 - 7.8 %    BASOPHILS 0 0.0 - 2.0 %    IMMATURE GRANULOCYTES 0.5 0.0 - 5.0 %    ABS. NEUTROPHILS 11.2 (H) 1.7 - 8.2 K/UL    ABS. LYMPHOCYTES 1.2 0.5 - 4.6 K/UL    ABS. MONOCYTES 0.4 0.1 - 1.3 K/UL    ABS. EOSINOPHILS 0.0 0.0 - 0.8 K/UL    ABS. BASOPHILS 0.0 0.0 - 0.2 K/UL    ABS. IMM.  GRANS. 0.1 0.0 - 0.5 K/UL   METABOLIC PANEL, BASIC    Collection Time: 01/24/17  2:35 AM   Result Value Ref Range    Sodium 142 136 - 145 mmol/L    Potassium 3.6 3.5 - 5.1 mmol/L    Chloride 111 (H) 98 - 107 mmol/L    CO2 25 21 - 32 mmol/L    Anion gap 6 (L) 7 - 16 mmol/L    Glucose 108 (H) 65 - 100 mg/dL    BUN 48 (H) 8 - 23 MG/DL    Creatinine 1.06 (H) 0.6 - 1.0 MG/DL    GFR est AA >60 >60 ml/min/1.73m2    GFR est non-AA 51 (L) >60 ml/min/1.73m2    Calcium 7.5 (L) 8.3 - 10.4 MG/DL   VANCOMYCIN, TROUGH    Collection Time: 01/24/17  9:47 AM   Result Value Ref Range    Vancomycin,trough 15.6 5 - 20 ug/mL        Recent Imaging:  CXR Results  (Last 48 hours)    None        CT Results  (Last 48 hours)    None          Assessment and Plan:     Hospital Problems as of 1/24/2017  Date Reviewed: 9/26/2016          Codes Class Noted - Resolved POA    MRSA bacteremia ICD-10-CM: R78.81  ICD-9-CM: 790.7, 041.12  1/22/2017 - Present Yes        Debility (Chronic) ICD-10-CM: R53.81  ICD-9-CM: 799.3  1/21/2017 - Present Yes        Dementia without behavioral disturbance (Chronic) ICD-10-CM: F03.90  ICD-9-CM: 294.20  1/21/2017 - Present Yes        Recurrent UTI ICD-10-CM: N39.0  ICD-9-CM: 599.0  1/21/2017 - Present Yes        * (Principal)Sepsis due to methicillin resistant Staphylococcus aureus (MRSA) ([facility]) ICD-10-CM: A41.02  ICD-9-CM: 038.12, 995.91  1/20/2017 - Present Yes        Pressure ulcer, stage 4 ([facility]) ICD-10-CM: L89.94  ICD-9-CM: 707.00, 707.24  1/20/2017 - Present Yes        Acute cystitis without hematuria ICD-10-CM: N30.00  ICD-9-CM: 595.0  12/26/2016 - Present Yes        Chronic pain (Chronic) ICD-10-CM: W40.52  ICD-9-CM: 338.29  4/21/2013 - Present Yes    Overview Signed 4/21/2013  3:59 PM by Sahara Mahajan MD     Shoulders, feet, back             RESOLVED: Tachycardia ICD-10-CM: R00.0  ICD-9-CM: 785.0  1/20/2017 - 1/21/2017 Yes    Overview Signed 1/20/2017  9:25 PM by Reed Lakhani, DO JIN                     PLAN:    · Hospice to meet with family today. Per SW likely go to hospice tomorrow.   · Cont current plan of care and comfort care      Signed:  Oma Reza MD

## 2017-01-24 NOTE — PROGRESS NOTES
Orders received for bean insertion and was inserted w/o difficulty using sterile technique. Pt tolerated well. Urine noted to be thick cloudy very dark yellow with sediment and 800 was emptied out from bag. Then urine in tubing changed completely to a thick dark red bloody urine. And 700ml of that color and consistency emptied at that time. Dr. Danial Beach notified and states to continue to monitor it for now. No orders received at this time and states pt will be having an H/H in am and that was fine for now.

## 2017-01-24 NOTE — PROGRESS NOTES
Critical called from lab and hgb is 6.3. Dr. Argelia Shaws notified of result and states will call family to ask if they would like blood transfusion done. Will continue to monitor.

## 2017-01-24 NOTE — PROGRESS NOTES
Problem: Interdisciplinary Rounds  Goal: Interdisciplinary Rounds  Interdisciplinary team rounds were held 1/24/2017 with the following team members:Care Management, Nursing, Nurse Practitioner, Nutrition, Pharmacy and Physician      Plan of care discussed. See clinical pathway and/or care plan for interventions and desired outcomes.

## 2017-01-24 NOTE — PROGRESS NOTES
Pharmacy Note:    Vancomycin trough was 15.6, which is within desired range. Will continue vancomycin at 1000 mg IV Q24h and continue to follow pt daily.     Thanks,  Shruti Fountain, PharmD

## 2017-01-24 NOTE — HOSPICE
[facility]-    Met with pt, her son and daughter-in-law at bedside to discuss hospice services. Discussed hospice philosophy of care, care at home, care team members and freq of visits. Also discussed the [facility] for respite aand symptom management. Family would like pt to return home with comfort measures and state that pt is a DNR. Dr Sharee Blanchard called and pt approved for routine hospice services. The plan-  1. Pt has bed that will be picked up tomorrow betw 1-5. [address] to deliver at 1700- airss mattress and hospital bed, OTB table, 02 set up. 2.  Pt to transport home at 1730 1/25  [facility]25  4.   Consents and DNR signed with Julio Mera    Thank you-  Madi Parks RN  [facility] liaison  [phone]

## 2017-01-24 NOTE — PROGRESS NOTES
Dr. Jaspreet Moseley called with HGB 7.2. And made Dr aware of urine output of only 60 since midnight but is pink/yellow color now. Per Dr. Galo Lorenzo til am to discuss with family. No further orders at this time. Will monitor.

## 2017-01-25 NOTE — DISCHARGE SUMMARY
Patient ID:  Ashwin Sepulveda  [id number]  80 y.o.  1/15/1923  Admit date: 1/20/2017  8:08 PM  Discharge date and time: 1/25/2017  Attending: Roger Olivarez DO  PCP:  Bibi Recinos MD  Treatment Team: Attending Provider: Roger Olivarez DO; Consulting Provider: Irais Gregorio NP; Utilization Review: Cleo Beyer RN; Care Coordinator: ROSALIO Orta    Principal Diagnosis Sepsis due to methicillin resistant Staphylococcus aureus (MRSA) [facility])   Principal Problem:    Sepsis due to methicillin resistant Staphylococcus aureus (MRSA) ([facility] [address] (1/20/2017)    Active Problems:    Chronic pain (4/21/2013)      Overview: Shoulders, feet, back      Acute cystitis without hematuria (12/26/2016)      Pressure ulcer, stage 4 ([facility] [address] (1/20/2017)      Debility (1/21/2017)      Dementia without behavioral disturbance (1/21/2017)      Recurrent UTI (1/21/2017)      MRSA bacteremia (1/22/2017)             Hospital Course:  Please refer to the admission H&P for details of presentation. In summary, the patient is a 80year old female with history of recurrent UTI, dementia, decubitus, presented with sepsis secondary to UTI, she has had a previous hospital stay admitted 12/26 and  Discharged to rehab where she was  till  1/18, readmitted  To [address] [place] [address] 1/20 with worsening symptoms of confusion sepsis and recurrent UTI vs decubitus. Palliative care was consulted and family has opted for hospice. She has been accepted to [facility]  and will be discharged today for further management and comfort.      Significant Diagnostic Studies:       Labs: Results:       Chemistry Recent Labs      01/25/17   0623 01/24/17   0235  01/23/17   0625   GLU  127*  108*  129*   NA  142  142  141   K  3.9  3.6  4.3   CL  112*  111*  109*   CO2  24 25 23   BUN  37*  48*  57*   CREA  0.84  1.06*  1.27*   CA  7.5*  7.5*  7.8*   AGAP  6*  6*  9      CBC w/Diff Recent Labs      01/25/17   0623 01/24/17   0235  01/23/17   2108  01/23/17 0625   WBC  19.7*  12.9*   --   12.8*   RBC  2.94*  2.55*   --   2.85*   HGB  8.4*  7.2*  6.3*  7.9*   HCT  26.6*  22.6*  20.4*  25.8*   PLT  178  187   --   256   GRANS  86*  88*   --   88*   LYMPH  10*  9*   --   8*   EOS  1  0*   --   0*      Cardiac Enzymes No results for input(s): CPK, CKND1, DARLINE in the last 72 hours. No lab exists for component: CKRMB, TROIP   Coagulation No results for input(s): PTP, INR, APTT in the last 72 hours. No lab exists for component: INREXT    Lipid Panel Lab Results   Component Value Date/Time    Cholesterol, total 161 10/01/2013 12:33 PM    HDL Cholesterol 62 10/01/2013 12:33 PM    LDL, calculated 75 10/01/2013 12:33 PM    VLDL, calculated 25 10/01/2013 12:33 PM    Triglyceride 123 10/01/2013 12:33 PM    CHOL/HDL Ratio 2.6 10/01/2013 12:33 PM      BNP No results for input(s): BNPP in the last 72 hours. Liver Enzymes No results for input(s): TP, ALB, TBIL, AP, SGOT, GPT in the last 72 hours. No lab exists for component: DBIL   Thyroid Studies No results found for: T4, T3U, TSH, TSHEXT       Results for orders placed or performed during the hospital encounter of 10/17/12   EKG, 12 LEAD, INITIAL   Result Value Ref Range    Systolic BP  mmHg    Diastolic BP  mmHg    Ventricular Rate 72 BPM    Atrial Rate 72 BPM    P-R Interval 158 ms    QRS Duration 142 ms    Q-T Interval 420 ms    QTC Calculation (Bezet) 459 ms    Calculated P Axis 86 degrees    Calculated R Axis 77 degrees    Calculated T Axis 75 degrees    Diagnosis       Normal sinus rhythm  Left bundle branch block  Abnormal ECG  No previous ECGs available  Confirmed by MICHAEL EVANS [facility], [facility].Leighann Osborn ([id number]) on 10/18/2012 7:59:40 AM     CT Results (most recent):    Results from [place] encounter on 12/26/16   CT HEAD WO CONT   Narrative Noncontrast CT of the brain.      COMPARISON: none    INDICATION: Altered mental status    TECHNIQUE: Contiguous axial images were obtained from the skull base through the  vertex without IV contrast. Radiation dose reduction techniques were used for  this study:  Our CT scanners use one or all of the following: Automated exposure  control, adjustment of the mA and/or kVp according to patient's size, iterative  reconstruction. FINDINGS:    There is no acute intracranial hemorrhage or evidence for acute territorial  infarction. There is no mass effect, midline shift or hydrocephalus. There is no  extra-axial fluid collection. The cerebellum and brainstem are grossly  unremarkable. Periventricular diffuse hypodensities are nonspecific and likely secondary to  chronic small vessel disease. There is generalized cerebral volume loss,  age-related. Visualized orbits and the globes are intact. Visualized paranasal sinuses and  the mastoid air cells are aerated. Surrounding bones are intact. Impression IMPRESSION:    1. Chronic small vessel disease. Generalized cerebral volume loss, age-related. 2. No hydrocephalus. No acute intracranial abnormality. VAS/US Results (most recent):  No results found for this or any previous visit. XR Results (most recent):    Results from Hospital Encounter encounter on 12/26/16   XR CHEST PA LAT   Narrative Frontal and lateral views of the chest     COMPARISON: none    INDICATION: Altered mental status    FINDINGS:     Mild bibasilar interstitial densities, likely atelectasis. There is suggestion  of emphysema. No pneumothorax, large pleural effusion or pulmonary edema. Cardiac mediastinal contour appears within normal limits. Increased right  paratracheal density may be due to overlapping of vascular structures. Bones are  diffusely osteopenic. There are severe degenerative changes at the glenohumeral  joints. Impression IMPRESSION:    Probable bibasilar atelectasis.         Discharge Exam:  Visit Vitals    /68 (BP 1 Location: Left arm, BP Patient Position: At rest;Supine)    Pulse 91    Temp 97.4 °F (36.3 °C)    Resp 16  Ht 5' 5\" (1.651 m)    Wt 63.5 kg (140 lb)    SpO2 93%    BMI 23.3 kg/m2     General appearance: elderly female asleep. Ill appearing mm dry  Lungs: clear to auscultation bilaterally  Heart: regular rate and rhythm, S1, S2 normal, no murmur, click, rub or gallop  Abdomen: soft, non-tender. Bowel sounds normal. No masses,  no organomegaly  Extremities: no cyanosis or edema  Neurologic: altered, confused    Disposition: home hospice  Discharge Condition: stable  Patient Instructions:   Current Discharge Medication List      CONTINUE these medications which have CHANGED    Details   HYDROcodone-acetaminophen (NORCO)  mg tablet Take 1 Tab by mouth every six (6) hours as needed for Pain. Max Daily Amount: 4 Tabs. Qty: 10 Tab, Refills: 0      Morphine (RAFAELA) 10 mg SR capsule Take 10 mg by mouth daily. Max Daily Amount: 10 mg.  Qty: 10 Cap, Refills: 0         CONTINUE these medications which have NOT CHANGED    Details   brief disposable (ADULT BRIEFS - LARGE) misc by Does Not Apply route.   Qty: 1 Package, Refills: 11             Activity: bed rest, may leave bean in  Diet: Comfort feeding  Wound Care: Keep wound clean and dry and As directed    Follow-up  ·   [facility]  Time spent to discharge patient greater 40 minutes  Signed:  Kristie Myles MD  1/25/2017  10:44 AM

## 2017-01-25 NOTE — PROGRESS NOTES
Assessment complete. Respirations even and unlabored. Pt resting calmly in bed. Pt oriented to person only. Pt has 4+ pitting edema to lower extremities and 3+ edema to hands. Dressing to sacral wound and heel wound clean dry and intact. Call light in reach, pt instructed to call for assistance, will need reinforcement. Bed alarm on.

## 2017-01-25 NOTE — PROGRESS NOTES
Assessment complete via flow sheet. Pt oriented to self only. HR regular. Lung sounds clear. Pt has large ulcer to sacrum with small open areas noted. Allevyn dressing in place on sacrum and left hip. Allevyn changed to sacrum per order. Small dark red areas noted over spine. Deep tissue injury noted to R heel. Heels floated. +4 pitting edema noted to BLE. Non pitting edema noted to BUE and upper back. Hill catheter draining thick dale/bloody urine with sediment and small clots. Bed low/locked with call light with in reach. Bed alarm in place.

## 2017-01-25 NOTE — PROGRESS NOTES
Pt reports back and leg pain. 5-325 mg of Norco given PO crushed and mixed with applesauce for a FLACC scale of 4. See MAR.

## 2017-01-25 NOTE — PROGRESS NOTES
Hill catheter irrigated per MD orders. Multiple clots removed. Hill draining dark, cloudy dale urine. 650 emptied. No distress noted. Will continue to monitor.

## 2017-02-08 PROCEDURE — 0651 HSPC ROUTINE HOME CARE

## 2017-02-09 VITALS
SYSTOLIC BLOOD PRESSURE: 92 MMHG | DIASTOLIC BLOOD PRESSURE: 48 MMHG | RESPIRATION RATE: 28 BRPM | HEART RATE: 112 BPM | TEMPERATURE: 97.7 F